# Patient Record
Sex: MALE | URBAN - METROPOLITAN AREA
[De-identification: names, ages, dates, MRNs, and addresses within clinical notes are randomized per-mention and may not be internally consistent; named-entity substitution may affect disease eponyms.]

---

## 2019-05-01 ENCOUNTER — APPOINTMENT (OUTPATIENT)
Age: 44
Setting detail: DERMATOLOGY
End: 2019-05-03

## 2019-05-01 VITALS — HEIGHT: 70 IN | WEIGHT: 210 LBS | RESPIRATION RATE: 16 BRPM

## 2019-05-01 DIAGNOSIS — L74.51 PRIMARY FOCAL HYPERHIDROSIS: ICD-10-CM

## 2019-05-01 DIAGNOSIS — L21.8 OTHER SEBORRHEIC DERMATITIS: ICD-10-CM

## 2019-05-01 PROBLEM — L74.510 PRIMARY FOCAL HYPERHIDROSIS, AXILLA: Status: ACTIVE | Noted: 2019-05-01

## 2019-05-01 PROCEDURE — OTHER PRESCRIPTION: OTHER

## 2019-05-01 PROCEDURE — 64650 CHEMODENERV ECCRINE GLANDS: CPT

## 2019-05-01 PROCEDURE — 99213 OFFICE O/P EST LOW 20 MIN: CPT | Mod: 25

## 2019-05-01 PROCEDURE — OTHER COUNSELING: OTHER

## 2019-05-01 PROCEDURE — OTHER BOTOX HYPERHIDROSIS THERAPY: OTHER

## 2019-05-01 RX ORDER — KETOCONAZOLE 20.5 MG/ML
2% SHAMPOO, SUSPENSION TOPICAL BIW
Qty: 1 | Refills: 6 | Status: ERX | COMMUNITY
Start: 2019-05-01

## 2019-05-01 ASSESSMENT — LOCATION DETAILED DESCRIPTION DERM
LOCATION DETAILED: LEFT AXILLARY VAULT
LOCATION DETAILED: RIGHT AXILLARY VAULT
LOCATION DETAILED: POSTERIOR MID-PARIETAL SCALP

## 2019-05-01 ASSESSMENT — LOCATION SIMPLE DESCRIPTION DERM
LOCATION SIMPLE: POSTERIOR SCALP
LOCATION SIMPLE: LEFT AXILLARY VAULT
LOCATION SIMPLE: RIGHT AXILLARY VAULT

## 2019-05-01 ASSESSMENT — LOCATION ZONE DERM
LOCATION ZONE: SCALP
LOCATION ZONE: AXILLAE

## 2019-05-01 NOTE — HPI: SWEATING (HYPERHIDROSIS)
How Severe Is It?: severe
Is This A New Presentation, Or A Follow-Up?: Follow Up Hyperhidrosis
Sweating Severity Scale: 3- The sweating is barely tolerable and frequently interferes with daily activities

## 2019-05-01 NOTE — PROCEDURE: COUNSELING
Patient Specific Counseling (Will Not Stick From Patient To Patient): ***Ketoconazole shampoo 2% refilled today
Detail Level: Zone

## 2019-12-19 ENCOUNTER — APPOINTMENT (OUTPATIENT)
Age: 44
Setting detail: DERMATOLOGY
End: 2019-12-19

## 2019-12-19 VITALS — HEIGHT: 72 IN | WEIGHT: 200 LBS | RESPIRATION RATE: 16 BRPM

## 2019-12-19 DIAGNOSIS — L74.51 PRIMARY FOCAL HYPERHIDROSIS: ICD-10-CM

## 2019-12-19 PROBLEM — L74.510 PRIMARY FOCAL HYPERHIDROSIS, AXILLA: Status: ACTIVE | Noted: 2019-12-19

## 2019-12-19 PROCEDURE — OTHER COUNSELING: OTHER

## 2019-12-19 PROCEDURE — 64650 CHEMODENERV ECCRINE GLANDS: CPT

## 2019-12-19 PROCEDURE — OTHER BOTOX HYPERHIDROSIS THERAPY: OTHER

## 2019-12-19 ASSESSMENT — LOCATION SIMPLE DESCRIPTION DERM
LOCATION SIMPLE: LEFT AXILLARY VAULT
LOCATION SIMPLE: RIGHT AXILLARY VAULT

## 2019-12-19 ASSESSMENT — LOCATION ZONE DERM: LOCATION ZONE: AXILLAE

## 2019-12-19 ASSESSMENT — LOCATION DETAILED DESCRIPTION DERM
LOCATION DETAILED: LEFT AXILLARY VAULT
LOCATION DETAILED: RIGHT AXILLARY VAULT

## 2019-12-19 NOTE — PROCEDURE: BOTOX HYPERHIDROSIS THERAPY
Procedure Details: - Patient presents today for hyperhidrosis botox therapeutic treatment.\\n- Pt was approved by Raquel for treatment today.\\[facility]: [id number] Procedure Details: - Patient presents today for hyperhidrosis botox therapeutic treatment.\\n- Pt was approved by Raquel for treatment today.\\[facility]: [phone]

## 2019-12-19 NOTE — HPI: SWEATING (HYPERHIDROSIS)
Is This A New Presentation, Or A Follow-Up?: Follow Up Hyperhidrosis
Additional History: Patient is here today for repeat hyperhidrosis Botox injections.

## 2019-12-19 NOTE — PROCEDURE: BOTOX HYPERHIDROSIS THERAPY
Consent: - Verbal and written consent obtained. \\n- Risks include but not limited to temporary bruising, swelling and tenderness at injection site, infection, muscle weakness, temporary effect, and incomplete chemical denervation of sweat glands.

## 2020-09-23 ENCOUNTER — APPOINTMENT (OUTPATIENT)
Dept: URBAN - METROPOLITAN AREA CLINIC 252 | Age: 45
Setting detail: DERMATOLOGY
End: 2020-09-27

## 2020-09-23 VITALS — RESPIRATION RATE: 16 BRPM | HEIGHT: 72 IN | WEIGHT: 195 LBS

## 2020-09-23 DIAGNOSIS — L74.51 PRIMARY FOCAL HYPERHIDROSIS: ICD-10-CM

## 2020-09-23 PROBLEM — L74.510 PRIMARY FOCAL HYPERHIDROSIS, AXILLA: Status: ACTIVE | Noted: 2020-09-23

## 2020-09-23 PROCEDURE — 64650 CHEMODENERV ECCRINE GLANDS: CPT

## 2020-09-23 PROCEDURE — OTHER COUNSELING: OTHER

## 2020-09-23 PROCEDURE — OTHER BOTOX HYPERHIDROSIS THERAPY: OTHER

## 2020-09-23 ASSESSMENT — LOCATION DETAILED DESCRIPTION DERM
LOCATION DETAILED: RIGHT AXILLARY VAULT
LOCATION DETAILED: LEFT AXILLARY VAULT

## 2020-09-23 ASSESSMENT — LOCATION SIMPLE DESCRIPTION DERM
LOCATION SIMPLE: RIGHT AXILLARY VAULT
LOCATION SIMPLE: LEFT AXILLARY VAULT

## 2020-09-23 ASSESSMENT — LOCATION ZONE DERM: LOCATION ZONE: AXILLAE

## 2020-09-23 NOTE — PROCEDURE: BOTOX HYPERHIDROSIS THERAPY
Procedure Details: - Patient presents today for hyperhidrosis botox therapeutic treatment.\\n- Pt was approved by Margarette KILLIAN for treatment today.\\[facility]: [phone] Procedure Details: - Patient presents today for hyperhidrosis botox therapeutic treatment.\\n- Pt was approved by Margarette KILLIAN for treatment today.\\[facility]: [phone]

## 2021-04-19 ENCOUNTER — RX ONLY (RX ONLY)
Age: 46
End: 2021-04-19

## 2021-04-19 RX ORDER — ONABOTULINUMTOXINA 100 [USP'U]/1
INJECTION, POWDER, LYOPHILIZED, FOR SOLUTION INTRADERMAL; INTRAMUSCULAR
Qty: 2 | Refills: 0 | Status: ERX | COMMUNITY
Start: 2021-04-19

## 2021-04-20 ENCOUNTER — APPOINTMENT (OUTPATIENT)
Dept: URBAN - METROPOLITAN AREA CLINIC 252 | Age: 46
Setting detail: DERMATOLOGY
End: 2021-04-21

## 2021-04-20 VITALS — WEIGHT: 200 LBS | HEIGHT: 72 IN | RESPIRATION RATE: 16 BRPM

## 2021-04-20 DIAGNOSIS — L21.8 OTHER SEBORRHEIC DERMATITIS: ICD-10-CM

## 2021-04-20 DIAGNOSIS — L90.5 SCAR CONDITIONS AND FIBROSIS OF SKIN: ICD-10-CM

## 2021-04-20 PROCEDURE — OTHER COUNSELING: OTHER

## 2021-04-20 PROCEDURE — 11900 INJECT SKIN LESIONS </W 7: CPT

## 2021-04-20 PROCEDURE — 99213 OFFICE O/P EST LOW 20 MIN: CPT | Mod: 25

## 2021-04-20 PROCEDURE — OTHER PRESCRIPTION: OTHER

## 2021-04-20 PROCEDURE — OTHER INTRALESIONAL KENALOG: OTHER

## 2021-04-20 PROCEDURE — OTHER MEDICATION COUNSELING: OTHER

## 2021-04-20 RX ORDER — FLUCONAZOLE 150 MG/1
150MG TABLET ORAL QD
Qty: 4 | Refills: 0 | Status: ERX | COMMUNITY
Start: 2021-04-20

## 2021-04-20 RX ORDER — BETAMETHASONE DIPROPIONATE 0.5 MG/ML
0.05% LOTION TOPICAL BID
Qty: 1 | Refills: 1 | Status: ERX | COMMUNITY
Start: 2021-04-20

## 2021-04-20 RX ORDER — KETOCONAZOLE 20 MG/ML
2% SHAMPOO, SUSPENSION TOPICAL BIW
Qty: 1 | Refills: 6 | Status: ERX | COMMUNITY
Start: 2021-04-20

## 2021-04-20 ASSESSMENT — LOCATION SIMPLE DESCRIPTION DERM
LOCATION SIMPLE: SCALP
LOCATION SIMPLE: RIGHT HAND

## 2021-04-20 ASSESSMENT — LOCATION ZONE DERM
LOCATION ZONE: HAND
LOCATION ZONE: SCALP

## 2021-04-20 ASSESSMENT — LOCATION DETAILED DESCRIPTION DERM
LOCATION DETAILED: RIGHT ULNAR DORSAL HAND
LOCATION DETAILED: LEFT SUPERIOR PARIETAL SCALP

## 2021-04-20 NOTE — HPI: RASH
What Type Of Note Output Would You Prefer (Optional)?: Bullet Format
How Severe Is Your Rash?: moderate
Is This A New Presentation, Or A Follow-Up?: Rash
Additional History: Burn hand

## 2021-05-12 ENCOUNTER — APPOINTMENT (OUTPATIENT)
Dept: URBAN - METROPOLITAN AREA CLINIC 252 | Age: 46
Setting detail: DERMATOLOGY
End: 2021-05-16

## 2021-05-12 VITALS — WEIGHT: 194 LBS | HEIGHT: 72 IN | RESPIRATION RATE: 16 BRPM

## 2021-05-12 DIAGNOSIS — L74.510 PRIMARY FOCAL HYPERHIDROSIS, AXILLA: ICD-10-CM

## 2021-05-12 PROCEDURE — 64650 CHEMODENERV ECCRINE GLANDS: CPT

## 2021-05-12 PROCEDURE — OTHER BOTOX HYPERHIDROSIS THERAPY: OTHER

## 2021-05-12 ASSESSMENT — LOCATION ZONE DERM: LOCATION ZONE: AXILLAE

## 2021-05-12 ASSESSMENT — LOCATION DETAILED DESCRIPTION DERM
LOCATION DETAILED: LEFT AXILLARY VAULT
LOCATION DETAILED: RIGHT AXILLARY VAULT

## 2021-05-12 ASSESSMENT — LOCATION SIMPLE DESCRIPTION DERM
LOCATION SIMPLE: RIGHT AXILLARY VAULT
LOCATION SIMPLE: LEFT AXILLARY VAULT

## 2021-05-12 NOTE — PROCEDURE: BOTOX HYPERHIDROSIS THERAPY
Procedure Details: - Patient presents today for Botox therapeutic treatment for hyperhidrosis.\\n- [facility]: [id number] Procedure Details: - Patient presents today for Botox therapeutic treatment for hyperhidrosis.\\n- [facility]: [id number]

## 2022-10-19 NOTE — PROCEDURE: MEDICATION COUNSELING
Ilumya Counseling: I discussed with the patient the risks of tildrakizumab including but not limited to immunosuppression, malignancy, posterior leukoencephalopathy syndrome, and serious infections.  The patient understands that monitoring is required including a PPD at baseline and must alert us or the primary physician if symptoms of infection or other concerning signs are noted. [FreeTextEntry1] : CXR PA and Lateral \par The costophrenic and cardiophrenic angles are sharp\par The wiliam parenchyma shows no infiltrates, consolidations, or nodules \par The Mediastinum is within normal limits\par No pleural effusions\par

## 2024-04-16 ENCOUNTER — LAB REQUISITION (OUTPATIENT)
Dept: LAB | Facility: HOSPITAL | Age: 49
End: 2024-04-16
Payer: COMMERCIAL

## 2024-04-16 DIAGNOSIS — M79.89 OTHER SPECIFIED SOFT TISSUE DISORDERS: ICD-10-CM

## 2024-04-16 DIAGNOSIS — M79.673 PAIN IN UNSPECIFIED FOOT: ICD-10-CM

## 2024-04-16 DIAGNOSIS — G89.18 OTHER ACUTE POSTPROCEDURAL PAIN: ICD-10-CM

## 2024-04-16 LAB
BASOPHILS # BLD AUTO: 0.1 10E3/UL (ref 0–0.2)
BASOPHILS NFR BLD AUTO: 1 %
CRP SERPL-MCNC: <3 MG/L
EOSINOPHIL # BLD AUTO: 0.2 10E3/UL (ref 0–0.7)
EOSINOPHIL NFR BLD AUTO: 3 %
ERYTHROCYTE [SEDIMENTATION RATE] IN BLOOD BY WESTERGREN METHOD: 9 MM/HR (ref 0–20)
IMM GRANULOCYTES # BLD: 0 10E3/UL
IMM GRANULOCYTES NFR BLD: 0 %
LYMPHOCYTES # BLD AUTO: 2.3 10E3/UL (ref 0.8–5.3)
LYMPHOCYTES NFR BLD AUTO: 36 %
MONOCYTES # BLD AUTO: 0.4 10E3/UL (ref 0–1.3)
MONOCYTES NFR BLD AUTO: 6 %
NEUTROPHILS # BLD AUTO: 3.4 10E3/UL (ref 1.6–8.3)
NEUTROPHILS NFR BLD AUTO: 53 %
NRBC # BLD AUTO: 0 10E3/UL
NRBC BLD AUTO-RTO: 0 /100
T4 FREE SERPL-MCNC: 1.26 NG/DL (ref 0.9–1.7)
TSH SERPL DL<=0.005 MIU/L-ACNC: 2.07 UIU/ML (ref 0.3–4.2)
URATE SERPL-MCNC: 4.2 MG/DL (ref 3.4–7)
WBC # BLD AUTO: 6.4 10E3/UL (ref 4–11)

## 2024-04-16 PROCEDURE — 84480 ASSAY TRIIODOTHYRONINE (T3): CPT | Mod: ORL | Performed by: ORTHOPAEDIC SURGERY

## 2024-04-16 PROCEDURE — 85048 AUTOMATED LEUKOCYTE COUNT: CPT | Mod: ORL | Performed by: ORTHOPAEDIC SURGERY

## 2024-04-16 PROCEDURE — 36415 COLL VENOUS BLD VENIPUNCTURE: CPT | Mod: ORL | Performed by: ORTHOPAEDIC SURGERY

## 2024-04-16 PROCEDURE — 86140 C-REACTIVE PROTEIN: CPT | Mod: ORL | Performed by: ORTHOPAEDIC SURGERY

## 2024-04-16 PROCEDURE — 84439 ASSAY OF FREE THYROXINE: CPT | Mod: ORL | Performed by: ORTHOPAEDIC SURGERY

## 2024-04-16 PROCEDURE — 84443 ASSAY THYROID STIM HORMONE: CPT | Mod: ORL | Performed by: ORTHOPAEDIC SURGERY

## 2024-04-16 PROCEDURE — 84436 ASSAY OF TOTAL THYROXINE: CPT | Mod: ORL | Performed by: ORTHOPAEDIC SURGERY

## 2024-04-16 PROCEDURE — 85652 RBC SED RATE AUTOMATED: CPT | Mod: ORL | Performed by: ORTHOPAEDIC SURGERY

## 2024-04-16 PROCEDURE — 84550 ASSAY OF BLOOD/URIC ACID: CPT | Mod: ORL | Performed by: ORTHOPAEDIC SURGERY

## 2024-04-16 PROCEDURE — 86038 ANTINUCLEAR ANTIBODIES: CPT | Mod: ORL | Performed by: ORTHOPAEDIC SURGERY

## 2024-04-16 PROCEDURE — 86618 LYME DISEASE ANTIBODY: CPT | Mod: ORL | Performed by: ORTHOPAEDIC SURGERY

## 2024-04-16 PROCEDURE — 86431 RHEUMATOID FACTOR QUANT: CPT | Mod: ORL | Performed by: ORTHOPAEDIC SURGERY

## 2024-04-17 LAB
ANA SER QL IF: NEGATIVE
B BURGDOR IGG+IGM SER QL: 0.04
RHEUMATOID FACT SERPL-ACNC: <10 IU/ML
T3 SERPL-MCNC: 86 NG/DL (ref 85–202)
T4 SERPL-MCNC: 7.3 UG/DL (ref 4.5–11.7)

## 2024-04-19 ENCOUNTER — LAB REQUISITION (OUTPATIENT)
Dept: LAB | Facility: CLINIC | Age: 49
End: 2024-04-19
Payer: COMMERCIAL

## 2024-04-19 DIAGNOSIS — L08.1 ERYTHRASMA: ICD-10-CM

## 2024-04-19 LAB
GRAM STAIN RESULT: NORMAL
GRAM STAIN RESULT: NORMAL

## 2024-04-19 PROCEDURE — 87075 CULTR BACTERIA EXCEPT BLOOD: CPT | Mod: ORL | Performed by: ORTHOPAEDIC SURGERY

## 2024-04-19 PROCEDURE — 87116 MYCOBACTERIA CULTURE: CPT | Mod: ORL | Performed by: ORTHOPAEDIC SURGERY

## 2024-04-19 PROCEDURE — 88305 TISSUE EXAM BY PATHOLOGIST: CPT | Mod: TC,ORL | Performed by: ORTHOPAEDIC SURGERY

## 2024-04-19 PROCEDURE — 87070 CULTURE OTHR SPECIMN AEROBIC: CPT | Mod: ORL | Performed by: ORTHOPAEDIC SURGERY

## 2024-04-19 PROCEDURE — 87186 SC STD MICRODIL/AGAR DIL: CPT | Mod: ORL | Performed by: ORTHOPAEDIC SURGERY

## 2024-04-19 PROCEDURE — 87102 FUNGUS ISOLATION CULTURE: CPT | Mod: ORL | Performed by: ORTHOPAEDIC SURGERY

## 2024-04-19 PROCEDURE — 87205 SMEAR GRAM STAIN: CPT | Mod: ORL | Performed by: ORTHOPAEDIC SURGERY

## 2024-04-19 PROCEDURE — 87206 SMEAR FLUORESCENT/ACID STAI: CPT | Mod: ORL | Performed by: ORTHOPAEDIC SURGERY

## 2024-04-23 LAB
PATH REPORT.COMMENTS IMP SPEC: NORMAL
PATH REPORT.COMMENTS IMP SPEC: NORMAL
PATH REPORT.FINAL DX SPEC: NORMAL
PATH REPORT.GROSS SPEC: NORMAL
PATH REPORT.MICROSCOPIC SPEC OTHER STN: NORMAL
PATH REPORT.RELEVANT HX SPEC: NORMAL
PHOTO IMAGE: NORMAL

## 2024-04-23 PROCEDURE — 88305 TISSUE EXAM BY PATHOLOGIST: CPT | Mod: 26 | Performed by: PATHOLOGY

## 2024-04-24 LAB — BACTERIA TISS BX CULT: NO GROWTH

## 2024-04-30 LAB
BACTERIA TISS BX CULT: ABNORMAL
BACTERIA TISS BX CULT: ABNORMAL

## 2024-05-17 LAB — BACTERIA BONE ANAEROBE+AEROBE CULT: NO GROWTH

## 2024-06-15 LAB
ACID FAST STAIN (ARUP): NORMAL

## 2024-07-31 ENCOUNTER — LAB REQUISITION (OUTPATIENT)
Dept: LAB | Facility: CLINIC | Age: 49
End: 2024-07-31
Payer: COMMERCIAL

## 2024-07-31 DIAGNOSIS — L08.1 ERYTHRASMA: ICD-10-CM

## 2024-07-31 LAB
BACTERIA SPEC CULT: NORMAL
BACTERIA SPEC CULT: NORMAL
GRAM STAIN RESULT: NORMAL

## 2024-07-31 PROCEDURE — 87102 FUNGUS ISOLATION CULTURE: CPT | Mod: ORL | Performed by: ORTHOPAEDIC SURGERY

## 2024-07-31 PROCEDURE — 87070 CULTURE OTHR SPECIMN AEROBIC: CPT | Mod: ORL | Performed by: ORTHOPAEDIC SURGERY

## 2024-07-31 PROCEDURE — 87206 SMEAR FLUORESCENT/ACID STAI: CPT | Mod: ORL | Performed by: ORTHOPAEDIC SURGERY

## 2024-07-31 PROCEDURE — 87205 SMEAR GRAM STAIN: CPT | Mod: ORL | Performed by: ORTHOPAEDIC SURGERY

## 2024-07-31 PROCEDURE — 88311 DECALCIFY TISSUE: CPT | Mod: TC,ORL | Performed by: ORTHOPAEDIC SURGERY

## 2024-07-31 PROCEDURE — 87075 CULTR BACTERIA EXCEPT BLOOD: CPT | Mod: ORL | Performed by: ORTHOPAEDIC SURGERY

## 2024-07-31 PROCEDURE — 87186 SC STD MICRODIL/AGAR DIL: CPT | Mod: ORL | Performed by: ORTHOPAEDIC SURGERY

## 2024-08-01 ENCOUNTER — LAB REQUISITION (OUTPATIENT)
Dept: LAB | Facility: CLINIC | Age: 49
End: 2024-08-01
Payer: COMMERCIAL

## 2024-08-01 DIAGNOSIS — L08.1 ERYTHRASMA: ICD-10-CM

## 2024-08-02 ENCOUNTER — HOSPITAL ENCOUNTER (INPATIENT)
Facility: HOSPITAL | Age: 49
LOS: 2 days | Discharge: HOME | DRG: 561 | End: 2024-08-04
Attending: INTERNAL MEDICINE | Admitting: INTERNAL MEDICINE
Payer: COMMERCIAL

## 2024-08-02 DIAGNOSIS — T14.8XXA WOUND INFECTION: Primary | ICD-10-CM

## 2024-08-02 DIAGNOSIS — L08.9 WOUND INFECTION: Primary | ICD-10-CM

## 2024-08-02 LAB
ANION GAP SERPL CALCULATED.3IONS-SCNC: 11 MMOL/L (ref 7–15)
BASOPHILS # BLD AUTO: 0.1 10E3/UL (ref 0–0.2)
BASOPHILS NFR BLD AUTO: 1 %
BUN SERPL-MCNC: 16.5 MG/DL (ref 6–20)
CALCIUM SERPL-MCNC: 8.8 MG/DL (ref 8.8–10.4)
CHLORIDE SERPL-SCNC: 100 MMOL/L (ref 98–107)
CREAT SERPL-MCNC: 1.09 MG/DL (ref 0.67–1.17)
CRP SERPL-MCNC: <3 MG/L
EGFRCR SERPLBLD CKD-EPI 2021: 83 ML/MIN/1.73M2
EOSINOPHIL # BLD AUTO: 0.2 10E3/UL (ref 0–0.7)
EOSINOPHIL NFR BLD AUTO: 3 %
ERYTHROCYTE [DISTWIDTH] IN BLOOD BY AUTOMATED COUNT: 14.5 % (ref 10–15)
GLUCOSE SERPL-MCNC: 107 MG/DL (ref 70–99)
HCO3 SERPL-SCNC: 27 MMOL/L (ref 22–29)
HCT VFR BLD AUTO: 39.6 % (ref 40–53)
HGB BLD-MCNC: 13.1 G/DL (ref 13.3–17.7)
IMM GRANULOCYTES # BLD: 0 10E3/UL
IMM GRANULOCYTES NFR BLD: 0 %
LYMPHOCYTES # BLD AUTO: 2.6 10E3/UL (ref 0.8–5.3)
LYMPHOCYTES NFR BLD AUTO: 33 %
MCH RBC QN AUTO: 29 PG (ref 26.5–33)
MCHC RBC AUTO-ENTMCNC: 33.1 G/DL (ref 31.5–36.5)
MCV RBC AUTO: 88 FL (ref 78–100)
MONOCYTES # BLD AUTO: 0.5 10E3/UL (ref 0–1.3)
MONOCYTES NFR BLD AUTO: 6 %
NEUTROPHILS # BLD AUTO: 4.6 10E3/UL (ref 1.6–8.3)
NEUTROPHILS NFR BLD AUTO: 57 %
NRBC # BLD AUTO: 0 10E3/UL
NRBC BLD AUTO-RTO: 0 /100
PLATELET # BLD AUTO: 221 10E3/UL (ref 150–450)
POTASSIUM SERPL-SCNC: 3.9 MMOL/L (ref 3.4–5.3)
RBC # BLD AUTO: 4.51 10E6/UL (ref 4.4–5.9)
SODIUM SERPL-SCNC: 138 MMOL/L (ref 135–145)
WBC # BLD AUTO: 8 10E3/UL (ref 4–11)

## 2024-08-02 PROCEDURE — 250N000011 HC RX IP 250 OP 636: Performed by: INTERNAL MEDICINE

## 2024-08-02 PROCEDURE — 87040 BLOOD CULTURE FOR BACTERIA: CPT | Performed by: INTERNAL MEDICINE

## 2024-08-02 PROCEDURE — 36415 COLL VENOUS BLD VENIPUNCTURE: CPT | Performed by: INTERNAL MEDICINE

## 2024-08-02 PROCEDURE — 120N000001 HC R&B MED SURG/OB

## 2024-08-02 PROCEDURE — 99223 1ST HOSP IP/OBS HIGH 75: CPT | Performed by: INTERNAL MEDICINE

## 2024-08-02 PROCEDURE — 250N000013 HC RX MED GY IP 250 OP 250 PS 637: Performed by: INTERNAL MEDICINE

## 2024-08-02 PROCEDURE — 85004 AUTOMATED DIFF WBC COUNT: CPT | Performed by: INTERNAL MEDICINE

## 2024-08-02 PROCEDURE — 80048 BASIC METABOLIC PNL TOTAL CA: CPT | Performed by: INTERNAL MEDICINE

## 2024-08-02 PROCEDURE — 258N000003 HC RX IP 258 OP 636: Performed by: INTERNAL MEDICINE

## 2024-08-02 PROCEDURE — 86140 C-REACTIVE PROTEIN: CPT | Performed by: INTERNAL MEDICINE

## 2024-08-02 RX ORDER — FLUTICASONE FUROATE AND VILANTEROL 200; 25 UG/1; UG/1
1 POWDER RESPIRATORY (INHALATION) DAILY
COMMUNITY
Start: 2024-03-07

## 2024-08-02 RX ORDER — AMOXICILLIN 250 MG
1 CAPSULE ORAL 2 TIMES DAILY PRN
Status: DISCONTINUED | OUTPATIENT
Start: 2024-08-02 | End: 2024-08-04 | Stop reason: HOSPADM

## 2024-08-02 RX ORDER — NALOXONE HYDROCHLORIDE 0.4 MG/ML
0.4 INJECTION, SOLUTION INTRAMUSCULAR; INTRAVENOUS; SUBCUTANEOUS
Status: DISCONTINUED | OUTPATIENT
Start: 2024-08-02 | End: 2024-08-04 | Stop reason: HOSPADM

## 2024-08-02 RX ORDER — ROSUVASTATIN CALCIUM 10 MG/1
10 TABLET, COATED ORAL DAILY
COMMUNITY
Start: 2024-02-27

## 2024-08-02 RX ORDER — ONDANSETRON 4 MG/1
4 TABLET, ORALLY DISINTEGRATING ORAL EVERY 6 HOURS PRN
Status: DISCONTINUED | OUTPATIENT
Start: 2024-08-02 | End: 2024-08-04 | Stop reason: HOSPADM

## 2024-08-02 RX ORDER — VANCOMYCIN HYDROCHLORIDE 1 G/200ML
1000 INJECTION, SOLUTION INTRAVENOUS EVERY 12 HOURS
Status: DISCONTINUED | OUTPATIENT
Start: 2024-08-03 | End: 2024-08-04

## 2024-08-02 RX ORDER — ACETAMINOPHEN 650 MG/1
650 SUPPOSITORY RECTAL EVERY 4 HOURS PRN
Status: DISCONTINUED | OUTPATIENT
Start: 2024-08-02 | End: 2024-08-04 | Stop reason: HOSPADM

## 2024-08-02 RX ORDER — HYDROXYZINE HYDROCHLORIDE 25 MG/1
25 TABLET, FILM COATED ORAL EVERY 6 HOURS PRN
COMMUNITY

## 2024-08-02 RX ORDER — CALCIUM CARBONATE 500 MG/1
1000 TABLET, CHEWABLE ORAL 4 TIMES DAILY PRN
Status: DISCONTINUED | OUTPATIENT
Start: 2024-08-02 | End: 2024-08-02

## 2024-08-02 RX ORDER — AMOXICILLIN 250 MG
2 CAPSULE ORAL 2 TIMES DAILY PRN
Status: DISCONTINUED | OUTPATIENT
Start: 2024-08-02 | End: 2024-08-02

## 2024-08-02 RX ORDER — ACETAMINOPHEN 325 MG/1
650 TABLET ORAL EVERY 4 HOURS PRN
Status: DISCONTINUED | OUTPATIENT
Start: 2024-08-02 | End: 2024-08-02

## 2024-08-02 RX ORDER — CARBIDOPA AND LEVODOPA 25; 100 MG/1; MG/1
1 TABLET, EXTENDED RELEASE ORAL DAILY
COMMUNITY
Start: 2023-12-21 | End: 2024-09-10 | Stop reason: DRUGHIGH

## 2024-08-02 RX ORDER — LIDOCAINE 40 MG/G
CREAM TOPICAL
Status: DISCONTINUED | OUTPATIENT
Start: 2024-08-02 | End: 2024-08-04 | Stop reason: HOSPADM

## 2024-08-02 RX ORDER — MELOXICAM 7.5 MG/1
7.5 TABLET ORAL DAILY
Status: ON HOLD | COMMUNITY
Start: 2024-07-18 | End: 2024-08-02

## 2024-08-02 RX ORDER — HYDROCODONE BITARTRATE AND ACETAMINOPHEN 5; 325 MG/1; MG/1
1 TABLET ORAL EVERY 6 HOURS PRN
Status: ON HOLD | COMMUNITY
End: 2024-08-04

## 2024-08-02 RX ORDER — SERTRALINE HYDROCHLORIDE 100 MG/1
100 TABLET, FILM COATED ORAL DAILY
COMMUNITY
Start: 2024-04-17

## 2024-08-02 RX ORDER — NALOXONE HYDROCHLORIDE 0.4 MG/ML
0.2 INJECTION, SOLUTION INTRAMUSCULAR; INTRAVENOUS; SUBCUTANEOUS
Status: DISCONTINUED | OUTPATIENT
Start: 2024-08-02 | End: 2024-08-04 | Stop reason: HOSPADM

## 2024-08-02 RX ORDER — PIPERACILLIN SODIUM, TAZOBACTAM SODIUM 3; .375 G/15ML; G/15ML
3.38 INJECTION, POWDER, LYOPHILIZED, FOR SOLUTION INTRAVENOUS ONCE
Status: COMPLETED | OUTPATIENT
Start: 2024-08-02 | End: 2024-08-02

## 2024-08-02 RX ORDER — AMOXICILLIN 250 MG
2 CAPSULE ORAL 2 TIMES DAILY PRN
Status: DISCONTINUED | OUTPATIENT
Start: 2024-08-02 | End: 2024-08-04 | Stop reason: HOSPADM

## 2024-08-02 RX ORDER — CLONAZEPAM 0.5 MG/1
1 TABLET ORAL
COMMUNITY
End: 2024-09-10 | Stop reason: DRUGHIGH

## 2024-08-02 RX ORDER — CALCIUM CARBONATE 500 MG/1
1000 TABLET, CHEWABLE ORAL 4 TIMES DAILY PRN
Status: DISCONTINUED | OUTPATIENT
Start: 2024-08-02 | End: 2024-08-04 | Stop reason: HOSPADM

## 2024-08-02 RX ORDER — PIPERACILLIN SODIUM, TAZOBACTAM SODIUM 3; .375 G/15ML; G/15ML
3.38 INJECTION, POWDER, LYOPHILIZED, FOR SOLUTION INTRAVENOUS EVERY 8 HOURS
Status: DISCONTINUED | OUTPATIENT
Start: 2024-08-02 | End: 2024-08-02

## 2024-08-02 RX ORDER — LANSOPRAZOLE 30 MG/1
30 CAPSULE, DELAYED RELEASE ORAL
COMMUNITY
Start: 2024-02-27

## 2024-08-02 RX ORDER — ALBUTEROL SULFATE 90 UG/1
2 AEROSOL, METERED RESPIRATORY (INHALATION) EVERY 4 HOURS PRN
COMMUNITY
Start: 2024-05-23

## 2024-08-02 RX ORDER — KETOCONAZOLE 20 MG/ML
SHAMPOO TOPICAL DAILY PRN
COMMUNITY
Start: 2024-02-06

## 2024-08-02 RX ORDER — CEFAZOLIN SODIUM 1 G/50ML
2000 SOLUTION INTRAVENOUS ONCE
Status: COMPLETED | OUTPATIENT
Start: 2024-08-02 | End: 2024-08-03

## 2024-08-02 RX ORDER — OXYCODONE HYDROCHLORIDE 5 MG/1
5 TABLET ORAL EVERY 4 HOURS PRN
Status: DISCONTINUED | OUTPATIENT
Start: 2024-08-02 | End: 2024-08-04 | Stop reason: HOSPADM

## 2024-08-02 RX ORDER — PIPERACILLIN SODIUM, TAZOBACTAM SODIUM 3; .375 G/15ML; G/15ML
3.38 INJECTION, POWDER, LYOPHILIZED, FOR SOLUTION INTRAVENOUS EVERY 8 HOURS
Status: DISCONTINUED | OUTPATIENT
Start: 2024-08-03 | End: 2024-08-03

## 2024-08-02 RX ORDER — ACETAMINOPHEN 325 MG/1
650 TABLET ORAL EVERY 4 HOURS PRN
Status: DISCONTINUED | OUTPATIENT
Start: 2024-08-02 | End: 2024-08-04 | Stop reason: HOSPADM

## 2024-08-02 RX ORDER — HYDROMORPHONE HCL IN WATER/PF 6 MG/30 ML
0.2 PATIENT CONTROLLED ANALGESIA SYRINGE INTRAVENOUS
Status: DISCONTINUED | OUTPATIENT
Start: 2024-08-02 | End: 2024-08-02

## 2024-08-02 RX ORDER — ACETAMINOPHEN 650 MG/1
650 SUPPOSITORY RECTAL EVERY 4 HOURS PRN
Status: DISCONTINUED | OUTPATIENT
Start: 2024-08-02 | End: 2024-08-02

## 2024-08-02 RX ORDER — AMOXICILLIN 250 MG
1 CAPSULE ORAL 2 TIMES DAILY PRN
Status: DISCONTINUED | OUTPATIENT
Start: 2024-08-02 | End: 2024-08-02

## 2024-08-02 RX ORDER — CARBIDOPA AND LEVODOPA 25; 100 MG/1; MG/1
1 TABLET ORAL 3 TIMES DAILY
COMMUNITY
Start: 2023-06-28

## 2024-08-02 RX ORDER — ONDANSETRON 2 MG/ML
4 INJECTION INTRAMUSCULAR; INTRAVENOUS EVERY 6 HOURS PRN
Status: DISCONTINUED | OUTPATIENT
Start: 2024-08-02 | End: 2024-08-04 | Stop reason: HOSPADM

## 2024-08-02 RX ADMIN — OXYCODONE HYDROCHLORIDE 5 MG: 5 TABLET ORAL at 21:54

## 2024-08-02 RX ADMIN — SODIUM CHLORIDE 2000 MG: 9 INJECTION, SOLUTION INTRAVENOUS at 22:28

## 2024-08-02 RX ADMIN — HYDROMORPHONE HYDROCHLORIDE 0.2 MG: 0.2 INJECTION, SOLUTION INTRAMUSCULAR; INTRAVENOUS; SUBCUTANEOUS at 19:43

## 2024-08-02 RX ADMIN — PIPERACILLIN AND TAZOBACTAM 3.38 G: 3; .375 INJECTION, POWDER, FOR SOLUTION INTRAVENOUS at 21:54

## 2024-08-02 ASSESSMENT — ACTIVITIES OF DAILY LIVING (ADL)
ADLS_ACUITY_SCORE: 34
DIFFICULTY_COMMUNICATING: NO
CONCENTRATING,_REMEMBERING_OR_MAKING_DECISIONS_DIFFICULTY: NO
ADLS_ACUITY_SCORE: 23
HEARING_DIFFICULTY_OR_DEAF: NO
DOING_ERRANDS_INDEPENDENTLY_DIFFICULTY: NO
CHANGE_IN_FUNCTIONAL_STATUS_SINCE_ONSET_OF_CURRENT_ILLNESS/INJURY: NO
ADLS_ACUITY_SCORE: 23
WALKING_OR_CLIMBING_STAIRS_DIFFICULTY: NO
EQUIPMENT_CURRENTLY_USED_AT_HOME: OTHER (SEE COMMENTS)
ADLS_ACUITY_SCORE: 34
DRESSING/BATHING_DIFFICULTY: NO
WEAR_GLASSES_OR_BLIND: YES
ADLS_ACUITY_SCORE: 23
TOILETING_ISSUES: NO
VISION_MANAGEMENT: GLASSES
DIFFICULTY_EATING/SWALLOWING: NO
ADLS_ACUITY_SCORE: 23
FALL_HISTORY_WITHIN_LAST_SIX_MONTHS: NO

## 2024-08-02 ASSESSMENT — COLUMBIA-SUICIDE SEVERITY RATING SCALE - C-SSRS
6. HAVE YOU EVER DONE ANYTHING, STARTED TO DO ANYTHING, OR PREPARED TO DO ANYTHING TO END YOUR LIFE?: NO
1. IN THE PAST MONTH, HAVE YOU WISHED YOU WERE DEAD OR WISHED YOU COULD GO TO SLEEP AND NOT WAKE UP?: NO
2. HAVE YOU ACTUALLY HAD ANY THOUGHTS OF KILLING YOURSELF IN THE PAST MONTH?: NO

## 2024-08-02 NOTE — PROGRESS NOTES
Patient admitted to room 02 at approximately 1719 via ambulation from direct admit- [facility].  Reason for Admission:   Report received from:   Patient was accompanied by Spouse.  Discharge transportation provided by:  Patient ambulated/transferred:  independently. self.  Patient is alert and orientated x 3.  Outpatient Observation education provided to: (patient, family, friend)  MDRO Education done if applicable (MRSA, VRE, etc)  Safety risks were identified during admission:  none.   Yellow risk/fall band applied:  No  Home meds sent home: No  Home meds sent to pharmacy:No IF YES add 1/2 sheet laminated page reminder to chart/clipboard   Detailed Belongings: Cell phone, laptop, earbuds, headphones, tshirt, shorts, underwear, sweatshirt, hat, knee scooter, chapstick, glasses

## 2024-08-02 NOTE — H&P
[facility]    History and Physical - Hospitalist Service       Date of Admission:  8/2/2024    Assessment & Plan        Isma Cook is a 48 year old male admitted on 8/2/2024.     He previously underwent right first MTP fusion on 3/6/2024.    He had persistent swelling postoperatively and underwent irrigation debridement on 4/19/2024 but cultures were negative other than 1 growth of Staphylococcus on the broth.    Admitted with post op wound infection        A/p :         Rt. 1st MTP fusion on 8/2/2024  Post op wound infection    He previously underwent right first MTP fusion on 3/6/2024.    He had persistent swelling postoperatively and underwent irrigation debridement on 4/19/2024 but cultures were negative other than 1 growth of Staphylococcus on the broth. This was felt to be likely contaminant.   He unfortunately has gone on to have persistent swelling despite normal inflammatory markers and as such he elected to proceed with revision on 7/31/2024.    At the time revision there was no obvious sign of infection but there was also no significant healing.    Ortho team revised his fixation and irrigate and debride and sent tissue for culture.    His cultures did come back with Staphylococcus Caprae.   He was transferred to the hospital for intravenous antibiotics and infectious disease consultation.     Blood cultures sent, follow wound cultures  Started on broad spectrum iv antibiotics - zosyn + vancomycin  Consult ortho + ID      Asthma  : on ellipta  Parkinson : on Sinemet   ER before 11 pm,  mg tid  HLD : on statin  Anxiety/Depression : on zoloft 100 mg daily, klonopin 10 mg at bedtime                   Diet:  NPO  DVT Prophylaxis: Pneumatic Compression Devices  Lopez Catheter: Not present  Lines: None     Cardiac Monitoring: None  Code Status:  full    Clinically Significant Risk Factors Present on Admission                                     Disposition Plan      Medically Ready for Discharge: Anticipated in 2-4 Days           Jacob Aranda MD  Hospitalist Service  [facility]  Securely message with [employer] (more info)  Text page via [employer] Paging/Directory     ______________________________________________________________________    Chief Complaint   Right MTP infection    History is obtained from the patient    History of Present Illness       He previously underwent right first MTP fusion on 3/6/2024.    He had persistent swelling postoperatively and underwent irrigation debridement on 4/19/2024 but cultures were negative other than 1 growth of Staphylococcus on the broth.    This was felt to be likely contaminant. He unfortunately has gone on to have persistent swelling despite normal inflammatory markers and as such he elected to proceed with revision on 7/31/2024.    At the time revision there was no obvious sign of infection but there was also no significant healing.  We did revise his fixation and irrigate and debride and sent tissue for culture.  His cultures did come back with Staphylococcus Caprae. He is now being transferred to the hospital for intravenous antibiotics and infectious disease consultation. He denies any fevers chills sweats feeling sick or unwell.  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    Past Surgical History   No past surgical history on file.    Prior to Admission Medications   None        Review of Systems          No fever or chills  No cp, sob, cough or phlegm  No abdominal symptoms  No urinary symptoms  No neuro symptoms           Physical Exam   Vital Signs: Temp: 98.1  F (36.7  C) Temp src: Oral BP: 112/73 Pulse: 79   Resp: 18 SpO2: 96 % O2 Device: None (Room air)    Weight: 0  lbs 0 oz       GENERAL: The patient is not in any acute distressed. Awake and alert.  HEENT: Nonicteric sclerae, PERRLA, EOMI. Oropharynx clear. Moist mucous membranes. Conjunctivae appear well perfused.  HEART: Regular rate and rhythm without murmurs.  LUNGS: Clear to auscultation bilaterally. No wheezing or crackles.  ABDOMEN: Soft, positive bowel sounds, nontender.  SKIN: No rash, no excessive bruising, petechiae, or purpura.  EXTREMITIES : no rashes, no swelling in legs, Rt. first MTP in dressing  NEUROLOGIC: conscious and oriented, follows commands, no obvious focal deficits.  ROS: All other systems negative       Medical Decision Making       76 MINUTES SPENT BY ME on the date of service doing chart review, history, exam, documentation & further activities per the note.  MANAGEMENT DISCUSSED with the following over the past 24 hours: rn, patient       Data     I have personally reviewed the following data over the past 24 hrs:    8.0  \   13.1 (L)   / 221     138 100 16.5 /  107 (H)   3.9 27 1.09 \     Procal: N/A CRP: <3.00 Lactic Acid: N/A

## 2024-08-03 LAB
ANION GAP SERPL CALCULATED.3IONS-SCNC: 6 MMOL/L (ref 7–15)
ANION GAP SERPL CALCULATED.3IONS-SCNC: 6 MMOL/L (ref 7–15)
BASOPHILS # BLD AUTO: 0 10E3/UL (ref 0–0.2)
BASOPHILS NFR BLD AUTO: 1 %
BUN SERPL-MCNC: 14.2 MG/DL (ref 6–20)
CALCIUM SERPL-MCNC: 8.8 MG/DL (ref 8.8–10.4)
CHLORIDE SERPL-SCNC: 103 MMOL/L (ref 98–107)
CHLORIDE SERPL-SCNC: 103 MMOL/L (ref 98–107)
CREAT SERPL-MCNC: 1.11 MG/DL (ref 0.67–1.17)
EGFRCR SERPLBLD CKD-EPI 2021: 81 ML/MIN/1.73M2
EOSINOPHIL # BLD AUTO: 0.2 10E3/UL (ref 0–0.7)
EOSINOPHIL NFR BLD AUTO: 3 %
ERYTHROCYTE [DISTWIDTH] IN BLOOD BY AUTOMATED COUNT: 14.3 % (ref 10–15)
GLUCOSE SERPL-MCNC: 91 MG/DL (ref 70–99)
HCO3 SERPL-SCNC: 29 MMOL/L (ref 22–29)
HCO3 SERPL-SCNC: 29 MMOL/L (ref 22–29)
HCT VFR BLD AUTO: 35.8 % (ref 40–53)
HGB BLD-MCNC: 12.1 G/DL (ref 13.3–17.7)
IMM GRANULOCYTES # BLD: 0 10E3/UL
IMM GRANULOCYTES NFR BLD: 0 %
LYMPHOCYTES # BLD AUTO: 2.9 10E3/UL (ref 0.8–5.3)
LYMPHOCYTES NFR BLD AUTO: 39 %
MCH RBC QN AUTO: 29.5 PG (ref 26.5–33)
MCHC RBC AUTO-ENTMCNC: 33.8 G/DL (ref 31.5–36.5)
MCV RBC AUTO: 87 FL (ref 78–100)
MONOCYTES # BLD AUTO: 0.6 10E3/UL (ref 0–1.3)
MONOCYTES NFR BLD AUTO: 8 %
NEUTROPHILS # BLD AUTO: 3.6 10E3/UL (ref 1.6–8.3)
NEUTROPHILS NFR BLD AUTO: 49 %
NRBC # BLD AUTO: 0 10E3/UL
NRBC BLD AUTO-RTO: 0 /100
PLATELET # BLD AUTO: 195 10E3/UL (ref 150–450)
POTASSIUM SERPL-SCNC: 4.1 MMOL/L (ref 3.4–5.3)
POTASSIUM SERPL-SCNC: 4.1 MMOL/L (ref 3.4–5.3)
RBC # BLD AUTO: 4.1 10E6/UL (ref 4.4–5.9)
SODIUM SERPL-SCNC: 138 MMOL/L (ref 135–145)
SODIUM SERPL-SCNC: 138 MMOL/L (ref 135–145)
WBC # BLD AUTO: 7.3 10E3/UL (ref 4–11)

## 2024-08-03 PROCEDURE — 250N000011 HC RX IP 250 OP 636

## 2024-08-03 PROCEDURE — 84295 ASSAY OF SERUM SODIUM: CPT | Performed by: INTERNAL MEDICINE

## 2024-08-03 PROCEDURE — 36415 COLL VENOUS BLD VENIPUNCTURE: CPT | Performed by: INTERNAL MEDICINE

## 2024-08-03 PROCEDURE — 120N000001 HC R&B MED SURG/OB

## 2024-08-03 PROCEDURE — 99207 PR APP CREDIT; MD BILLING SHARED VISIT: CPT | Mod: FS | Performed by: HOSPITALIST

## 2024-08-03 PROCEDURE — 99222 1ST HOSP IP/OBS MODERATE 55: CPT | Performed by: INTERNAL MEDICINE

## 2024-08-03 PROCEDURE — 99233 SBSQ HOSP IP/OBS HIGH 50: CPT | Mod: FS

## 2024-08-03 PROCEDURE — 85025 COMPLETE CBC W/AUTO DIFF WBC: CPT | Performed by: INTERNAL MEDICINE

## 2024-08-03 PROCEDURE — 250N000013 HC RX MED GY IP 250 OP 250 PS 637: Performed by: INTERNAL MEDICINE

## 2024-08-03 PROCEDURE — 99418 PROLNG IP/OBS E/M EA 15 MIN: CPT | Mod: FS

## 2024-08-03 PROCEDURE — 80048 BASIC METABOLIC PNL TOTAL CA: CPT | Performed by: INTERNAL MEDICINE

## 2024-08-03 PROCEDURE — 250N000011 HC RX IP 250 OP 636: Performed by: INTERNAL MEDICINE

## 2024-08-03 PROCEDURE — 250N000013 HC RX MED GY IP 250 OP 250 PS 637

## 2024-08-03 RX ORDER — SERTRALINE HYDROCHLORIDE 100 MG/1
100 TABLET, FILM COATED ORAL DAILY
Status: DISCONTINUED | OUTPATIENT
Start: 2024-08-03 | End: 2024-08-04 | Stop reason: HOSPADM

## 2024-08-03 RX ORDER — CARBIDOPA AND LEVODOPA 25; 100 MG/1; MG/1
1 TABLET, EXTENDED RELEASE ORAL DAILY
Status: DISCONTINUED | OUTPATIENT
Start: 2024-08-03 | End: 2024-08-04 | Stop reason: HOSPADM

## 2024-08-03 RX ORDER — ALBUTEROL SULFATE 90 UG/1
2 AEROSOL, METERED RESPIRATORY (INHALATION) EVERY 4 HOURS PRN
Status: DISCONTINUED | OUTPATIENT
Start: 2024-08-03 | End: 2024-08-04 | Stop reason: HOSPADM

## 2024-08-03 RX ORDER — CARBIDOPA AND LEVODOPA 25; 100 MG/1; MG/1
1 TABLET ORAL 3 TIMES DAILY
Status: DISCONTINUED | OUTPATIENT
Start: 2024-08-03 | End: 2024-08-04 | Stop reason: HOSPADM

## 2024-08-03 RX ORDER — HYDROXYZINE HYDROCHLORIDE 25 MG/1
25 TABLET, FILM COATED ORAL EVERY 6 HOURS PRN
Status: DISCONTINUED | OUTPATIENT
Start: 2024-08-03 | End: 2024-08-04 | Stop reason: HOSPADM

## 2024-08-03 RX ORDER — PANTOPRAZOLE SODIUM 40 MG/1
40 TABLET, DELAYED RELEASE ORAL
Status: DISCONTINUED | OUTPATIENT
Start: 2024-08-03 | End: 2024-08-04 | Stop reason: HOSPADM

## 2024-08-03 RX ORDER — CLONAZEPAM 0.5 MG/1
1 TABLET ORAL
Status: DISCONTINUED | OUTPATIENT
Start: 2024-08-03 | End: 2024-08-04 | Stop reason: HOSPADM

## 2024-08-03 RX ORDER — FLUTICASONE FUROATE AND VILANTEROL 200; 25 UG/1; UG/1
1 POWDER RESPIRATORY (INHALATION) DAILY
Status: DISCONTINUED | OUTPATIENT
Start: 2024-08-03 | End: 2024-08-04 | Stop reason: HOSPADM

## 2024-08-03 RX ORDER — ROSUVASTATIN CALCIUM 10 MG/1
10 TABLET, COATED ORAL DAILY
Status: DISCONTINUED | OUTPATIENT
Start: 2024-08-03 | End: 2024-08-04 | Stop reason: HOSPADM

## 2024-08-03 RX ORDER — HYDROMORPHONE HYDROCHLORIDE 1 MG/ML
0.5 INJECTION, SOLUTION INTRAMUSCULAR; INTRAVENOUS; SUBCUTANEOUS
Status: DISCONTINUED | OUTPATIENT
Start: 2024-08-03 | End: 2024-08-04 | Stop reason: HOSPADM

## 2024-08-03 RX ADMIN — SENNOSIDES AND DOCUSATE SODIUM 2 TABLET: 8.6; 5 TABLET ORAL at 16:25

## 2024-08-03 RX ADMIN — FLUTICASONE FUROATE AND VILANTEROL TRIFENATATE 1 PUFF: 200; 25 POWDER RESPIRATORY (INHALATION) at 10:19

## 2024-08-03 RX ADMIN — HYDROMORPHONE HYDROCHLORIDE 0.5 MG: 1 INJECTION, SOLUTION INTRAMUSCULAR; INTRAVENOUS; SUBCUTANEOUS at 22:28

## 2024-08-03 RX ADMIN — HYDROMORPHONE HYDROCHLORIDE 0.5 MG: 1 INJECTION, SOLUTION INTRAMUSCULAR; INTRAVENOUS; SUBCUTANEOUS at 10:20

## 2024-08-03 RX ADMIN — HYDROXYZINE HYDROCHLORIDE 25 MG: 25 TABLET ORAL at 09:36

## 2024-08-03 RX ADMIN — OXYCODONE HYDROCHLORIDE 5 MG: 5 TABLET ORAL at 08:25

## 2024-08-03 RX ADMIN — CARBIDOPA AND LEVODOPA 1 TABLET: 25; 100 TABLET, EXTENDED RELEASE ORAL at 10:18

## 2024-08-03 RX ADMIN — CARBIDOPA AND LEVODOPA 1 TABLET: 25; 100 TABLET ORAL at 14:18

## 2024-08-03 RX ADMIN — VANCOMYCIN HYDROCHLORIDE 1000 MG: 1 INJECTION, SOLUTION INTRAVENOUS at 10:30

## 2024-08-03 RX ADMIN — CARBIDOPA AND LEVODOPA 1 TABLET: 25; 100 TABLET ORAL at 22:27

## 2024-08-03 RX ADMIN — OXYCODONE HYDROCHLORIDE 5 MG: 5 TABLET ORAL at 16:25

## 2024-08-03 RX ADMIN — OXYCODONE HYDROCHLORIDE 5 MG: 5 TABLET ORAL at 02:13

## 2024-08-03 RX ADMIN — PIPERACILLIN AND TAZOBACTAM 3.38 G: 3; .375 INJECTION, POWDER, FOR SOLUTION INTRAVENOUS at 04:49

## 2024-08-03 RX ADMIN — VANCOMYCIN HYDROCHLORIDE 1000 MG: 1 INJECTION, SOLUTION INTRAVENOUS at 22:28

## 2024-08-03 RX ADMIN — SENNOSIDES AND DOCUSATE SODIUM 2 TABLET: 8.6; 5 TABLET ORAL at 02:13

## 2024-08-03 RX ADMIN — SERTRALINE HYDROCHLORIDE 100 MG: 100 TABLET ORAL at 10:18

## 2024-08-03 RX ADMIN — ROSUVASTATIN CALCIUM 10 MG: 10 TABLET, FILM COATED ORAL at 10:18

## 2024-08-03 ASSESSMENT — ACTIVITIES OF DAILY LIVING (ADL)
ADLS_ACUITY_SCORE: 24
ADLS_ACUITY_SCORE: 22
ADLS_ACUITY_SCORE: 22
ADLS_ACUITY_SCORE: 24
ADLS_ACUITY_SCORE: 23
ADLS_ACUITY_SCORE: 24
ADLS_ACUITY_SCORE: 23
ADLS_ACUITY_SCORE: 23
ADLS_ACUITY_SCORE: 22
ADLS_ACUITY_SCORE: 23
ADLS_ACUITY_SCORE: 23
ADLS_ACUITY_SCORE: 22
ADLS_ACUITY_SCORE: 25
ADLS_ACUITY_SCORE: 23
ADLS_ACUITY_SCORE: 24
ADLS_ACUITY_SCORE: 23
ADLS_ACUITY_SCORE: 22
ADLS_ACUITY_SCORE: 25
ADLS_ACUITY_SCORE: 25
ADLS_ACUITY_SCORE: 24
ADLS_ACUITY_SCORE: 25
ADLS_ACUITY_SCORE: 22
ADLS_ACUITY_SCORE: 22

## 2024-08-03 NOTE — PROGRESS NOTES
"Pt stating that he is here for IV abx- however none ordered. I have paged [facility] for several pts with no response- Pt and family upset and stating will leave. CAn you review and help please. Thanks    Maycol text paged at 2100.    Maycol stated that he would be reaching out to [facility] to discuss this pt.     Pt wife came out to the desk and stated that they will \"be leaving in 15 minutes\" if nothing gets addressed.     Writer got a hold of provider and orders were placed at 2140  "

## 2024-08-03 NOTE — CONSULTS
"Consultation - INFECTIOUS DISEASE CONSULTATION  Isma Cook ,  1975, MRN [id number]      infected first MTP joint, need I/V antibiotics  Wound infection    PCP: No primary care provider on file., None   Code status:  Full Code               Assessment:  DORA rae hardware infection, right great toe: present in 2024 and 2024 cultures. Hardware in place-likely cause of nonhealing. No systemic signs infection.  Parkinsons    Active Problems:    Wound infection      Recommendations:   - vanc IV  - need to add on susceptibilities to  cultures to determine final abx plan. Discussed with micro lab and they will add on  - further recommendations to follow clinical course  - ID will follow, thanks    Valencia Haskins MD  [facility]  Office Telephone [phone].  Fax [phone]  [facility] paging      HPI:    Isma Cook is a 48 year old male. History is provided by patient, chart.  Toe surgery in March. Incision healed over but \"didn't feel right\" with swelling, discomfort. Surgery again end April, CoNS in broth only thought to be contaminant. Again swelling, discomfort so went for hardware revision , cultures with dora rae again so sent in for abx plan. Today, he feels ok. Swelling in foot better. Tolerating antibiotics. Unclear what additional surgical plan is.      Chief complaint: Active Problems:    Wound infection      Medical History  Active Ambulatory Problems     Diagnosis Date Noted    No Active Ambulatory Problems     Resolved Ambulatory Problems     Diagnosis Date Noted    No Resolved Ambulatory Problems     No Additional Past Medical History         Surgical History  He  has no past surgical history on file.       Social History  Reviewed, and he          Family History  Reviewed and noncontributory to present problem, and family history is not on file.     Psychosocial Needs  Social History     Social History Narrative    Not on file     Additional " psychosocial needs reviewed per nursing assessment.       Allergies   Allergen Reactions    Ciprofloxacin      Skin reactions    Levaquin [Levofloxacin]      Skin reactions    Sulfa Antibiotics      Skin reactions      Medications Prior to Admission   Medication Sig Dispense Refill Last Dose    albuterol (PROAIR HFA/PROVENTIL HFA/VENTOLIN HFA) 108 (90 Base) MCG/ACT inhaler Inhale 2 puffs into the lungs every 4 hours as needed   More than a month at prn    carbidopa-levodopa (SINEMET CR)  MG CR tablet Take 1 tablet by mouth daily   8/1/2024 at pm    carbidopa-levodopa (SINEMET)  MG tablet Take 1 tablet by mouth 3 times daily   8/2/2024 at x3    clonazePAM (KLONOPIN) 0.5 MG tablet Take 1 mg by mouth nightly as needed for anxiety   8/1/2024 at \[facility]\""    fluticasone-vilanterol (BREO ELLIPTA) 200-25 MCG/ACT inhaler Inhale 1 puff into the lungs daily   8/2/2024 at am    HYDROcodone-acetaminophen (NORCO) 5-325 MG tablet Take 1 tablet by mouth every 6 hours as needed for severe pain   Past Week at prn    hydrOXYzine HCl (ATARAX) 25 MG tablet Take 25 mg by mouth every 6 hours as needed for other (pain)   Past Week at prn    ketoconazole (NIZORAL) 2 % external shampoo Apply topically daily as needed   Unknown at prn    LANsoprazole (PREVACID) 30 MG DR capsule Take 30 mg by mouth every morning (before breakfast)   8/2/2024 at am    rosuvastatin (CRESTOR) 10 MG tablet Take 10 mg by mouth daily   8/2/2024 at am    sertraline (ZOLOFT) 100 MG tablet Take 100 mg by mouth daily   8/2/2024 at am    Suvorexant (BELSOMRA) 10 MG tablet Take 10 mg by mouth at bedtime   8/1/2024 at \[facility]\""        Review of Systems:  A 12 point comprehensive review of systems was negative except as noted. Physical Exam:  Temp:  [97.8  F (36.6  C)-98.1  F (36.7  C)] 97.8  F (36.6  C)  Pulse:  [65-79] 65  Resp:  [16-18] 16  BP: (112-123)/(73-85) 123/82  SpO2:  [95 %-96 %] 95 %    GEN: alert and oriented x3, NAD  HEAD: atraumatic  ENT: moist membranes,  no thrush, anicteric sclera   NECK: supple, no nuchal rigidity  CARDIOVASCULAR: regular rate and rhythm, no murmurs, rubs, or gallops  PULMONARY: lungs clear to ausculation bilaterally  ABDOMEN: soft, nontender, nondistended. Normal bowel sounds  SKIN: no rashes or lesions. No stigma of endocarditis. Right toe incision ok, less swelling no erythema  PSYCH: grossly intact  MUSCULOSKELETAL: no synovitis               Pertinent Labs  personally reviewed.   CBC RESULTS:   Recent Labs   Lab Test 08/03/24  0540   WBC 7.3   RBC 4.10*   HGB 12.1*   HCT 35.8*   MCV 87   MCH 29.5   MCHC 33.8   RDW 14.3           Last Comprehensive Metabolic Panel:  Sodium   Date Value Ref Range Status   08/03/2024 138 135 - 145 mmol/L Final   08/03/2024 138 135 - 145 mmol/L Final     Potassium   Date Value Ref Range Status   08/03/2024 4.1 3.4 - 5.3 mmol/L Final   08/03/2024 4.1 3.4 - 5.3 mmol/L Final     Chloride   Date Value Ref Range Status   08/03/2024 103 98 - 107 mmol/L Final   08/03/2024 103 98 - 107 mmol/L Final     Carbon Dioxide (CO2)   Date Value Ref Range Status   08/03/2024 29 22 - 29 mmol/L Final   08/03/2024 29 22 - 29 mmol/L Final     Anion Gap   Date Value Ref Range Status   08/03/2024 6 (L) 7 - 15 mmol/L Final   08/03/2024 6 (L) 7 - 15 mmol/L Final     Glucose   Date Value Ref Range Status   08/03/2024 91 70 - 99 mg/dL Final     Urea Nitrogen   Date Value Ref Range Status   08/03/2024 14.2 6.0 - 20.0 mg/dL Final     Creatinine   Date Value Ref Range Status   08/03/2024 1.11 0.67 - 1.17 mg/dL Final     GFR Estimate   Date Value Ref Range Status   08/03/2024 81 >60 mL/min/1.73m2 Final     Comment:     eGFR calculated using 2021 CKD-EPI equation.     Calcium   Date Value Ref Range Status   08/03/2024 8.8 8.8 - 10.4 mg/dL Final     Comment:     Reference intervals for this test were updated on 7/16/2024 to reflect our healthy population more accurately. There may be differences in the flagging of prior results with  "similar values performed with this method. Those prior results can be interpreted in the context of the updated reference intervals.       No results found for: \"CRP\"     The following microbiology studies were personally reviewed:  No results found for: \"CULT\"    Urine Studies  No lab results found.    Vancomycin Levels  No lab results found.    Invalid input(s): \"VANCO\"    MICROBIOLOGY DATA:    All cultures:  7-Day Micro Results       Collected Updated Procedure Result Status      08/02/2024 1845 08/03/2024 0702 Blood Culture Peripheral Blood [[id number]]    Peripheral Blood    Preliminary result Component Value   Culture No growth after 12 hours  [P]                07/31/2024 0900 08/03/2024 0723 Tissue Aerobic Bacterial Culture Routine [[id number]]   Tissue from Toe, Right    Preliminary result Component Value   Culture No growth after 2 days  [P]                07/31/2024 0900 08/02/2024 1846 Anaerobic Bacterial Culture Routine [[id number]]    Tissue from Toe, Right    Preliminary result Component Value   Culture No anaerobic organisms isolated after 2 days  [P]                07/31/2024 0900 08/02/2024 1846 Fungal or Yeast Culture Routine [[id number]]   Tissue from Toe, Right    Preliminary result Component Value   Culture No growth after 2 days  [P]                07/31/2024 0900 07/31/2024 1152 Acid-Fast Bacilli Culture and Stain [[id number]]    Tissue from Toe, Right    In process Component Value   No component results            07/31/2024 0900 08/02/2024 1639 Acid-Fast Bacilli Culture and Stain [[id number]]    Tissue from Toe, Right    Preliminary result Component Value   Acid Fast Stain No acid fast bacilli seen  [P]    Performed By: [facility] [place], [place] [address]Gsncsernel Director: Mendez Mccarthy MD, PhDCLIA Number: [id number]   Acid Fast Stain No acid fast bacilli seen  [P]    Performed By: [facility] [place] [id number] " Director: Mendez Mccarthy MD, PhDCLIA Number: [id number]  This is an appended report. These results have been appended to a previously preliminary verified report.            07/31/2024 0900 08/02/2024 1916 Anaerobic Bacterial Culture Routine [[id number]]   Tissue from Toe, Right    Preliminary result Component Value   Culture No anaerobic organisms isolated after 2 days  [P]                07/31/2024 0900 08/03/2024 0723 Tissue Aerobic Bacterial Culture Routine [[id number]]    (Abnormal)   Tissue from Toe, Right    Preliminary result Component Value   Culture Culture in progress  [P]     1+ Staphylococcus caprae  [P]     Identification obtained by MALDI-TOF mass spectrometry research use only database. Test characteristics determined and verified by the [facility].  Susceptibilities not routinely done, refer to antibiogram to view typical susceptibility profiles               07/31/2024 0900 07/31/2024 1909 Gram Stain [[id number]]   Tissue from Toe, Right    Final result Component Value   GS Culture See corresponding culture for results   Gram Stain Result No organisms seen   Gram Stain Result No white blood cells seen            07/31/2024 0900 08/02/2024 1831 Anaerobic Bacterial Culture Routine [[id number]]    Tissue from Toe, Right    Preliminary result Component Value   Culture No anaerobic organisms isolated after 2 days  [P]                07/31/2024 0900 08/02/2024 1831 Fungal or Yeast Culture Routine [[id number]]   Tissue from Toe, Right    Preliminary result Component Value   Culture No growth after 2 days  [P]                07/31/2024 0900 07/31/2024 1158 Acid-Fast Bacilli Culture and Stain [[id number]]    Other from Toe, Right    In process Component Value   No component results            07/31/2024 0900 08/02/2024 1647 Acid-Fast Bacilli Culture and Stain [[id number]]    Other from Toe, Right    Preliminary result Component Value   Acid Fast Stain No acid  fast bacilli seen  [P]    Performed By: [facility] [place], [place] [address]Raterqedxg Director: Mendez Mccarthy MD, PhDCLIA Number: [id number]   Acid Fast Stain No acid fast bacilli seen  [P]    Performed By: [facility] [place], [place] [address]Aagxhhldjy Director: Mendez Mccarthy MD, PhDCLIA Number: [id number]  This is an appended report. These results have been appended to a previously preliminary verified report.            07/31/2024 0900 08/03/2024 0723 Tissue Aerobic Bacterial Culture Routine [[id number]]   (Abnormal)   Tissue from Toe, Right    Preliminary result Component Value   Culture Culture in progress  [P]     1+ Staphylococcus caprae  [P]     Identification obtained by MALDI-TOF mass spectrometry research use only database. Test characteristics determined and verified by the [facility].  Susceptibilities not routinely done, refer to antibiogram to view typical susceptibility profiles               07/31/2024 0900 08/02/2024 1846 Anaerobic Bacterial Culture Routine [[id number]]   Tissue from Toe, Right    Preliminary result Component Value   Culture No anaerobic organisms isolated after 2 days  [P]                07/31/2024 0900 08/02/2024 1846 Fungal or Yeast Culture Routine [[id number]]   Tissue from Toe, Right    Preliminary result Component Value   Culture No growth after 2 days  [P]                07/31/2024 0900 07/31/2024 1932 Gram Stain [[id number]]   Tissue from Toe, Right    Final result Component Value   GS Culture See corresponding culture for results   Gram Stain Result No organisms seen   Gram Stain Result 1+ WBC seen            07/31/2024 0900 07/31/2024 1200 Acid-Fast Bacilli Culture and Stain [[id number]]    Tissue from Toe, Right    In process Component Value   No component results            07/31/2024 0900 08/02/2024 1644 Acid-Fast Bacilli Culture and Stain [[id number]]    Tissue from Toe,  Right    Preliminary result Component Value   Acid Fast Stain No acid fast bacilli seen  [P]    Performed By: [facility] [place], [place] [address]Bqxyllcayc Director: Mendez Mccarthy MD, PhDCLIA Number: [id number]   Acid Fast Stain No acid fast bacilli seen  [P]    Performed By: [facility] [address] [place], [place] [address]Ehclvexxpp Director: Mendez Mccarthy MD, PhDCLIA Number: [id number]  This is an appended report. These results have been appended to a previously preliminary verified report.                     Pertinent Radiology  personally reviewed.     No results found.

## 2024-08-03 NOTE — PROGRESS NOTES
"Care Management Initial Consult    General Information  Assessment completed with: patient ,         Primary Care Provider verified and updated as needed:  yes   Readmission within the last 30 days:  no         Advance Care Planning:    no documents        Communication Assessment  Patient's communication style: spoken language (English or Bilingual)    Hearing Difficulty or Deaf: no   Wear Glasses or Blind: yes (Cheaters)    Cognitive  Cognitive/Neuro/Behavioral: WDL                      Living Environment:   People in home:     wife and college aged son  Current living Arrangements:        Able to return to prior arrangements: yes        Family/Social Support:  Care provided by: self, wife assists as needed   Provides care for:                  Description of Support System:  supportive         Current Resources:   Patient receiving home care services:  no     Community Resources:    Equipment currently used at home: other (see comments) (Scooter) and boot, non weight bearing  Supplies currently used at home:      Employment/Financial:  Employment Status: , has not worked for a year . Not a       Financial Concerns:   \"figuring it out\"          Does the patient's insurance plan have a 3 day qualifying hospital stay waiver?  No    Lifestyle & Psychosocial Needs:  Social Determinants of Health     Food Insecurity: Not on file   Depression: Not at risk (7/29/2024)    Received from [facility]     PHQ-2     PHQ2 Total: 0   Housing Stability: Not on file   Tobacco Use: Low Risk  (7/29/2024)    Received from [facility]     Patient History     Smoking Tobacco Use: Never     Smokeless Tobacco Use: Never     Passive Exposure: Not on file   Financial Resource Strain: Not on file   Alcohol Use: Not on file   Transportation Needs: Not on file   Physical Activity: Not on file   Interpersonal Safety: Not on file   Stress: Not on file   Social Connections: Not on file   Health " Literacy: Not on file       Functional Status:  Prior to admission patient needed assistance: transportation             Mental Health Status: no concerns          Chemical Dependency Status: no concerns                Values/Beliefs:  Spiritual, Cultural Beliefs, Jehovah's witness Practices, Values that affect care:    Judaism             Additional Information:  Patient is alert and oriented . He lives with wife Sanam. They have 4 adult children. He expects to discharge home with family transport. CM to follow. He may need home IVABs.    PAT Khan

## 2024-08-03 NOTE — PHARMACY-VANCOMYCIN DOSING SERVICE
"Pharmacy Vancomycin Initial Note  Date of Service 2024  Patient's  1975  48 year old, male    Indication: Skin and Soft Tissue Infection    Current estimated CrCl = Estimated Creatinine Clearance: 101.5 mL/min (based on SCr of 1.09 mg/dL).    Creatinine for last 3 days  2024:  6:45 PM Creatinine 1.09 mg/dL    Recent Vancomycin Level(s) for last 3 days  No results found for requested labs within last 3 days.      Vancomycin IV Administrations (past 72 hours)        No vancomycin orders with administrations in past 72 hours.                    Nephrotoxins and other renal medications (From now, onward)      Start     Dose/Rate Route Frequency Ordered Stop    24 1030  vancomycin (VANCOCIN) 1,000 mg in 200 mL dextrose intermittent infusion        Placed in \"Followed by\" Linked Group    1,000 mg  200 mL/hr over 1 Hours Intravenous EVERY 12 HOURS 24 0400  piperacillin-tazobactam (ZOSYN) 3.375 g vial to attach to  mL bag        Note to Pharmacy: For [facility], [facility] and [facility]: For Zosyn-naive patients, use the \"Zosyn initial dose + extended infusion\" order panel.    3.375 g  over 240 Minutes Intravenous EVERY 8 HOURS 24 2230  vancomycin (VANCOCIN) 2,000 mg in sodium chloride 0.9 % 500 mL intermittent infusion        Placed in \"Followed by\" Linked Group    2,000 mg  over 2 Hours Intravenous ONCE 24 2200  piperacillin-tazobactam (ZOSYN) 3.375 g vial to attach to  mL bag         3.375 g  over 30 Minutes Intravenous ONCE 24 2140              Contrast Orders - past 72 hours (72h ago, onward)      None            Insight[employer] Prediction of Planned Initial Vancomycin Regimen  Loading dose: 2000 mg at 22:30 2024.  Regimen: 1000 mg IV every 12 hours.  Start time: 10:30 on 2024  Exposure target: AUC24 (range)400-600 mg/L.hr   AUC24,ss: 504 mg/L.hr  Probability of AUC24 > 400: 74 %  Ctrough,ss: 16.2 " mg/L  Probability of Ctrough,ss > 20: 32 %  Probability of nephrotoxicity (Lodise LILI 2009): 12 %        Plan:  Start vancomycin 2000 mg IV Once, followed by 1000 mg IV  q12h.   Vancomycin monitoring method: AUC  Vancomycin therapeutic monitoring goal: 400-600 mg*h/L  Pharmacy will check vancomycin levels as appropriate in 1-3 Days.    Serum creatinine levels will be ordered daily for the first week of therapy and at least twice weekly for subsequent weeks.      Melinda Herrera, [facility]

## 2024-08-03 NOTE — PLAN OF CARE
Problem: Adult Inpatient Plan of Care  Goal: Optimal Comfort and Wellbeing  Intervention: Monitor Pain and Promote Comfort  Recent Flowsheet Documentation  Taken 8/3/2024 0258 by Liyah Noel RN  Pain Management Interventions:   pillow support provided   repositioned   rest  Taken 8/3/2024 0030 by Liyah Noel RN  Pain Management Interventions:   cold applied   pillow support provided   repositioned   rest     Problem: Pain Acute  Goal: Optimal Pain Control and Function  Intervention: Prevent or Manage Pain  Recent Flowsheet Documentation  Taken 8/3/2024 0030 by Liyah Noel RN  Bowel Elimination Promotion: (PRN Senna) other (see comments)  Medication Review/Management: medications reviewed   Goal Outcome Evaluation:      Plan of Care Reviewed With: patient    Overall Patient Progress: improvingOverall Patient Progress: improving    Pt A&Ox4. Vitals stable. Uses own scooter to ambulate to the bathroom. C/o pain at right toe gave PRN Oxy 5mg oral. Pain manageable to 3/10.  Last BM 7/29 gave PRN senna. NPO midnight for Ortho & ID consult today. IV abx Zosyn started at 0430.     DEONNA Arreola, RN

## 2024-08-03 NOTE — PLAN OF CARE
Goal Outcome Evaluation:      Problem: Adult Inpatient Plan of Care  Goal: Optimal Comfort and Wellbeing  Outcome: Progressing     Problem: Surgical Site Infection  Goal: Absence of Infection Signs and Symptoms  Outcome: Progressing       Patient arrived to the unit from the short stay unit.  Alert and oriented x 4.  NWB to RLE per patient report.  Wearing CAM boot and utilizes knee scooter to ambulate.  Independent with scooter.  Post-surgical shoe in place to LLE.  Patient stated provider saw wounds/incisions to bilateral feet and declined to have this writer remove dressings.  IV abx per orders.  Continue plan of care.

## 2024-08-03 NOTE — CONSULTS
ORTHOPEDIC CONSULTATION    Consultation  Isma Cook,  1975, MRN [id number]    infected first MTP joint, need I/V antibiotics  Wound infection    PCP: No primary care provider on file., None   Code status:  Full Code       Extended Emergency Contact Information  Primary Emergency Contact: Sanam Cook  Address: [address]           [place] [address]  Mobile Phone: [phone]  Relation: Spouse         IMPRESSION:  Isma Cook is a 48-year-old male who underwent right first MTP fusion on 3/6/2024 and subsequently underwent an I&D on 2024.  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    Admitted to the hospital yesterday for infectious disease consult, antibiotic plan, and coordination of care.    Appreciate multidisciplinary care team efforts with this patient.     PLAN:  - NWB RLE  - Keep dressings in place  - CAM boot when out of bed  - Antibiotics/dispo per ID/medicine -- appreciate assistance  - Will continue to follow    CHIEF COMPLAINT: Right toe infection    HISTORY OF PRESENT ILLNESS:  Isma Cook is a 48-year-old male who underwent a right first MTP fusion on 3/6/2024 and then subsequent underwent an I&D on 2024.  His postoperative course has been complicated by nonunion and he underwent revision fusion on 2024 with surgical cultures growing Staphylococcus caprae.    Presented to the emergency department yesterday for admission to the hospital for infectious disease consultation and coordination of care.    He denies fevers, chills, sweats.  Reports has been using his knee scooter since after surgery.  He is to remain nonweightbearing.  He is very diligent wearing the cam boot, but has it off during interview this morning.      ALLERGIES:   Review of patient's allergies indicates   Allergies   Allergen Reactions    Ciprofloxacin      Skin reactions    Levaquin [Levofloxacin]      Skin reactions     Sulfa Antibiotics      Skin reactions         MEDICATIONS UPON ADMISSION:  Medications were reviewed.  They include:   Medications Prior to Admission   Medication Sig Dispense Refill Last Dose    albuterol (PROAIR HFA/PROVENTIL HFA/VENTOLIN HFA) 108 (90 Base) MCG/ACT inhaler Inhale 2 puffs into the lungs every 4 hours as needed   More than a month at prn    carbidopa-levodopa (SINEMET CR)  MG CR tablet Take 1 tablet by mouth daily   8/1/2024 at pm    carbidopa-levodopa (SINEMET)  MG tablet Take 1 tablet by mouth 3 times daily   8/2/2024 at x3    clonazePAM (KLONOPIN) 0.5 MG tablet Take 1 mg by mouth nightly as needed for anxiety   8/1/2024 at [facility]    fluticasone-vilanterol (BREO ELLIPTA) 200-25 MCG/ACT inhaler Inhale 1 puff into the lungs daily   8/2/2024 at am    HYDROcodone-acetaminophen (NORCO) 5-325 MG tablet Take 1 tablet by mouth every 6 hours as needed for severe pain   Past Week at prn    hydrOXYzine HCl (ATARAX) 25 MG tablet Take 25 mg by mouth every 6 hours as needed for other (pain)   Past Week at prn    ketoconazole (NIZORAL) 2 % external shampoo Apply topically daily as needed   Unknown at prn    LANsoprazole (PREVACID) 30 MG DR capsule Take 30 mg by mouth every morning (before breakfast)   8/2/2024 at am    rosuvastatin (CRESTOR) 10 MG tablet Take 10 mg by mouth daily   8/2/2024 at am    sertraline (ZOLOFT) 100 MG tablet Take 100 mg by mouth daily   8/2/2024 at am    Suvorexant (BELSOMRA) 10 MG tablet Take 10 mg by mouth at bedtime   8/1/2024 at [facility]         SOCIAL HISTORY:   he        FAMILY HISTORY:  family history is not on file.      REVIEW OF SYSTEMS:   Reviewed with patient. See HPI, otherwise negative       PHYSICAL EXAMINATION:  Vitals: Temp:  [97.8  F (36.6  C)-98.1  F (36.7  C)] 97.8  F (36.6  C)  Pulse:  [65-79] 65  Resp:  [16-18] 16  BP: (112-123)/(73-85) 123/82  SpO2:  [95 %-96 %] 95 %  General: Resting comfortably in bed  Respiratory: Nonlabored breathing on room  air  Cardiovascular: Brisk cap refill in distal extremities  Right lower extremity:  -Resting clean dry and intact right foot  -Wiggles toes  -Sensation intact light touch in the dorsal and plantar toes  -Brisk capillary refill in the toes      RADIOGRAPHIC EVALUATION:  N/a    PERTINENT LABS:  Lab Results: personally reviewed.   not applicable  CRP <3  WBC 7.3  Hgb 12.1      Roberto Smith MD, MD  [facility]  Date: 8/3/2024  Time: 9:40 AM    CC1:   Prema Bansal MD    CC2:   No primary care provider on file.

## 2024-08-03 NOTE — PLAN OF CARE
Problem: Adult Inpatient Plan of Care  Goal: Absence of Hospital-Acquired Illness or Injury  Intervention: Identify and Manage Fall Risk  Recent Flowsheet Documentation  Taken 8/2/2024 1719 by Ralf Meeks, RN  Safety Promotion/Fall Prevention:   activity supervised   clutter free environment maintained   room organization consistent   safety round/check completed     Problem: Adult Inpatient Plan of Care  Goal: Absence of Hospital-Acquired Illness or Injury  Intervention: Prevent Infection  Recent Flowsheet Documentation  Taken 8/2/2024 1719 by Ralf Meeks, RN  Infection Prevention:   hand hygiene promoted   personal protective equipment utilized   single patient room provided     Problem: Adult Inpatient Plan of Care  Goal: Optimal Comfort and Wellbeing  Intervention: Monitor Pain and Promote Comfort  Recent Flowsheet Documentation  Taken 8/2/2024 1943 by Ralf Meeks, RN  Pain Management Interventions:   medication (see MAR)   emotional support   rest  Taken 8/2/2024 1718 by Ralf Meeks, RN  Pain Management Interventions: (No MD orders in yet)   emotional support   quiet environment facilitated   rest         Goal Outcome Evaluation:      Plan of Care Reviewed With: patient, spouse    Overall Patient Progress: improvingOverall Patient Progress: improving       A & O x4, pleasant. VSS on RA. Pain addressed with PRN medication. Vancomycin infusing via R PIV at end of shift. Regular diet, tolerating well. Independent with knee scooter. Family stopped by for support. Nursing continue to monitor.

## 2024-08-03 NOTE — PROGRESS NOTES
"[facility]    Medicine Progress Note - Hospitalist Service    Date of Admission:  8/2/2024    Assessment & Plan     Isma Cook is a 48-year-old male who underwent right first MTP fusion on 3/6/2024 and subsequently underwent an I&D on 4/19/2024.  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 Admitted to the hospital yesterday for infectious disease consult, antibiotic plan, and coordination of care.  Infectious disease and orthopedic consult recommendations IV antibiotics.  Patient likely to discharge in 2-3-day pending infectious disease and orthopedic consult final recommendations based on susceptibility culture.     # Right first MTP fusion 3/6/2024  # Postop wound infection  # Revision fusion on 7/31/2024  -Surgical cultures growing positive Staphylococcus Caprae  Orthopedic consult recommendation: nonweightbearing right lower extremity, cam boot when out of bed, antibiotics per infectious disease   Infectious disease consult recommendations: Vancomycin IV, add on susceptibilities to 7/31 cultures to determine final antibiotic plan  IV Dilaudid for pain  CRP normal  IV Vancomycin  IV Zosyn    # Anemia  Hemoglobin 13.1 yesterday trending down 12.1 today  Trend CBC in a.m.          Diet: Regular Diet Adult    DVT Prophylaxis: Pneumatic Compression Devices  Lopez Catheter: Not present  Lines: None     Cardiac Monitoring: None  Code Status: Full Code      Clinically Significant Risk Factors Present on Admission                          # Overweight: Estimated body mass index is 27.2 kg/m  as calculated from the following:    Height as of this encounter: 1.803 m (5' 11\").    Weight as of this encounter: 88.5 kg (195 lb).              Disposition Plan     Medically Ready for Discharge: Anticipated in 2-4 Days           The patient's care was discussed with the Attending Physician, Dr. Bansal .    Johnna Hammer, " NP  Hospitalist Service  [facility]  Securely message with App[employer] (more info)  Text page via Blinkit Paging/Directory   ______________________________________________________________________    Interval History   Orthopedic consult recommendation: nonweightbearing right lower extremity, cam boot when out of bed, antibiotics per infectious disease  Infectious disease consult recommendations: Vancomycin IV, add on susceptibilities to 731 cultures to determine final antibiotic plan  IV Dilaudid for pain    Physical Exam   Vital Signs: Temp: 97.8  F (36.6  C) Temp src: Oral BP: 123/82 Pulse: 65   Resp: 16 SpO2: 95 % O2 Device: None (Room air)    Weight: 195 lbs 0 oz    Constitutional: awake, alert, cooperative, no apparent distress, and appears stated age  Hematologic / Lymphatic: no cervical lymphadenopathy and no supraclavicular lymphadenopathy  Respiratory: No increased work of breathing, good air exchange, clear to auscultation bilaterally, no crackles or wheezing  Cardiovascular: Normal apical impulse, regular rate and rhythm, normal S1 and S2, no S3 or S4, and no murmur noted  GI: No scars, normal bowel sounds, soft, non-distended, non-tender, no masses palpated, no hepatosplenomegally  Skin: no bruising or bleeding, normal skin color, texture, turgor, no redness, warmth, or swelling, no rashes, and no lesions  Musculoskeletal: There is no redness, warmth, or swelling of the joints.  Full range of motion noted.  Motor strength is 5 out of 5 all extremities bilaterally.    Neurologic: Awake, alert, oriented to name, place and time.  Cranial nerves II-XII are grossly intact.  Motor is 5 out of 5 bilaterally.    Neuropsychiatric: General: normal, calm, and normal eye contact    Medical Decision Making       50 MINUTES SPENT BY ME on the date of service doing chart review, history, exam, documentation & further activities per the note.      Data     I have personally reviewed the following data  over the past 24 hrs:    7.3  \   12.1 (L)   / 195     138; 138 103; 103 14.2 /  91   4.1; 4.1 29; 29 1.11 \     Procal: N/A CRP: <3.00 Lactic Acid: N/A         Imaging results reviewed over the past 24 hrs:   No results found for this or any previous visit (from the past 24 hour(s)).

## 2024-08-03 NOTE — PLAN OF CARE
Problem: Adult Inpatient Plan of Care  Goal: Optimal Comfort and Wellbeing  Intervention: Monitor Pain and Promote Comfort  Recent Flowsheet Documentation  Taken 8/3/2024 1035 by Abigail Simon RN  Pain Management Interventions: emotional support  Taken 8/3/2024 1020 by Abigail Simon RN  Pain Management Interventions: medication (see MAR)  Taken 8/3/2024 0936 by Abigail Simon RN  Pain Management Interventions: medication (see MAR)  Taken 8/3/2024 0930 by Abigail Simon RN  Pain Management Interventions: emotional support  Taken 8/3/2024 0825 by Abigail Simon RN  Pain Management Interventions: medication (see MAR)     Problem: Surgical Site Infection  Goal: Absence of Infection Signs and Symptoms  Outcome: Progressing     Problem: Pain Acute  Goal: Optimal Pain Control and Function  Intervention: Prevent or Manage Pain  Recent Flowsheet Documentation  Taken 8/3/2024 0825 by Abigail Simon RN  Bowel Elimination Promotion: adequate fluid intake promoted   Goal Outcome Evaluation:       Pt AxO4, VSS, RA. Pt hasn't been out of bed for me but moves independently to bathroom with knee scooter and foot boot. Pt rating pain 7/10, given 5mg oxycodone and hydroxine without effect; provider notified and MAR updated, given 0.5 mg IV dilaudid with effect. IV Zosyn and Vancomycin completed.

## 2024-08-03 NOTE — PHARMACY-ADMISSION MEDICATION HISTORY
Pharmacist Admission Medication History    Admission medication history is complete. The information provided in this note is only as accurate as the sources available at the time of the update.    Information Source(s): Patient, Family member, and CareEverywhere/SureScripts via in-person    Pertinent Information: updated list based on [facility] list with [facility]    Changes made to PTA medication list:  Added: all  Deleted: None  Changed: None    Allergies reviewed with patient and updates made in EHR: yes    Medication History Completed By: GLENN DIAZ [facility] 8/2/2024 8:47 PM    PTA Med List   Medication Sig Last Dose    albuterol (PROAIR HFA/PROVENTIL HFA/VENTOLIN HFA) 108 (90 Base) MCG/ACT inhaler Inhale 2 puffs into the lungs every 4 hours as needed More than a month at prn    carbidopa-levodopa (SINEMET CR)  MG CR tablet Take 1 tablet by mouth daily 8/1/2024 at pm    carbidopa-levodopa (SINEMET)  MG tablet Take 1 tablet by mouth 3 times daily 8/2/2024 at x3    clonazePAM (KLONOPIN) 0.5 MG tablet Take 1 mg by mouth nightly as needed for anxiety 8/1/2024 at qhs    fluticasone-vilanterol (BREO ELLIPTA) 200-25 MCG/ACT inhaler Inhale 1 puff into the lungs daily 8/2/2024 at am    HYDROcodone-acetaminophen (NORCO) 5-325 MG tablet Take 1 tablet by mouth every 6 hours as needed for severe pain Past Week at prn    hydrOXYzine HCl (ATARAX) 25 MG tablet Take 25 mg by mouth every 6 hours as needed for other (pain) Past Week at prn    ketoconazole (NIZORAL) 2 % external shampoo Apply topically daily as needed Unknown at prn    LANsoprazole (PREVACID) 30 MG DR capsule Take 30 mg by mouth every morning (before breakfast) 8/2/2024 at am    rosuvastatin (CRESTOR) 10 MG tablet Take 10 mg by mouth daily 8/2/2024 at am    sertraline (ZOLOFT) 100 MG tablet Take 100 mg by mouth daily 8/2/2024 at am    Suvorexant (BELSOMRA) 10 MG tablet Take 10 mg by mouth at bedtime 8/1/2024 at qhs

## 2024-08-04 VITALS
SYSTOLIC BLOOD PRESSURE: 139 MMHG | HEART RATE: 83 BPM | HEIGHT: 71 IN | BODY MASS INDEX: 27.3 KG/M2 | DIASTOLIC BLOOD PRESSURE: 93 MMHG | OXYGEN SATURATION: 95 % | WEIGHT: 195 LBS | RESPIRATION RATE: 18 BRPM | TEMPERATURE: 97.5 F

## 2024-08-04 LAB
ANION GAP SERPL CALCULATED.3IONS-SCNC: 7 MMOL/L (ref 7–15)
BUN SERPL-MCNC: 15.5 MG/DL (ref 6–20)
CALCIUM SERPL-MCNC: 8.9 MG/DL (ref 8.8–10.4)
CHLORIDE SERPL-SCNC: 104 MMOL/L (ref 98–107)
CREAT SERPL-MCNC: 0.99 MG/DL (ref 0.67–1.17)
EGFRCR SERPLBLD CKD-EPI 2021: >90 ML/MIN/1.73M2
ERYTHROCYTE [DISTWIDTH] IN BLOOD BY AUTOMATED COUNT: 14.1 % (ref 10–15)
GLUCOSE SERPL-MCNC: 98 MG/DL (ref 70–99)
HCO3 SERPL-SCNC: 28 MMOL/L (ref 22–29)
HCT VFR BLD AUTO: 38 % (ref 40–53)
HGB BLD-MCNC: 12.6 G/DL (ref 13.3–17.7)
MCH RBC QN AUTO: 28.6 PG (ref 26.5–33)
MCHC RBC AUTO-ENTMCNC: 33.2 G/DL (ref 31.5–36.5)
MCV RBC AUTO: 86 FL (ref 78–100)
PLATELET # BLD AUTO: 209 10E3/UL (ref 150–450)
POTASSIUM SERPL-SCNC: 4 MMOL/L (ref 3.4–5.3)
RBC # BLD AUTO: 4.41 10E6/UL (ref 4.4–5.9)
SODIUM SERPL-SCNC: 139 MMOL/L (ref 135–145)
WBC # BLD AUTO: 6 10E3/UL (ref 4–11)

## 2024-08-04 PROCEDURE — 80048 BASIC METABOLIC PNL TOTAL CA: CPT

## 2024-08-04 PROCEDURE — 85027 COMPLETE CBC AUTOMATED: CPT

## 2024-08-04 PROCEDURE — 99239 HOSP IP/OBS DSCHRG MGMT >30: CPT | Performed by: INTERNAL MEDICINE

## 2024-08-04 PROCEDURE — 99232 SBSQ HOSP IP/OBS MODERATE 35: CPT | Performed by: INTERNAL MEDICINE

## 2024-08-04 PROCEDURE — 250N000011 HC RX IP 250 OP 636: Performed by: INTERNAL MEDICINE

## 2024-08-04 PROCEDURE — 250N000013 HC RX MED GY IP 250 OP 250 PS 637

## 2024-08-04 PROCEDURE — 36415 COLL VENOUS BLD VENIPUNCTURE: CPT

## 2024-08-04 RX ORDER — DOXYCYCLINE 100 MG/1
100 CAPSULE ORAL EVERY 12 HOURS SCHEDULED
Status: DISCONTINUED | OUTPATIENT
Start: 2024-08-04 | End: 2024-08-04 | Stop reason: HOSPADM

## 2024-08-04 RX ORDER — OXYCODONE HYDROCHLORIDE 5 MG/1
5 TABLET ORAL EVERY 4 HOURS PRN
Qty: 12 TABLET | Refills: 0 | Status: SHIPPED | OUTPATIENT
Start: 2024-08-04

## 2024-08-04 RX ORDER — ACETAMINOPHEN 325 MG/1
650 TABLET ORAL EVERY 4 HOURS PRN
COMMUNITY
Start: 2024-08-04

## 2024-08-04 RX ORDER — DOXYCYCLINE 100 MG/1
100 CAPSULE ORAL EVERY 12 HOURS
Qty: 90 CAPSULE | Refills: 0 | Status: SHIPPED | OUTPATIENT
Start: 2024-08-04 | End: 2024-09-10

## 2024-08-04 RX ORDER — AMOXICILLIN 250 MG
1 CAPSULE ORAL 2 TIMES DAILY PRN
Qty: 20 TABLET | Refills: 0 | Status: SHIPPED | OUTPATIENT
Start: 2024-08-04

## 2024-08-04 RX ADMIN — VANCOMYCIN HYDROCHLORIDE 1000 MG: 1 INJECTION, SOLUTION INTRAVENOUS at 10:41

## 2024-08-04 RX ADMIN — FLUTICASONE FUROATE AND VILANTEROL TRIFENATATE 1 PUFF: 200; 25 POWDER RESPIRATORY (INHALATION) at 08:48

## 2024-08-04 RX ADMIN — CARBIDOPA AND LEVODOPA 1 TABLET: 25; 100 TABLET ORAL at 08:48

## 2024-08-04 RX ADMIN — PANTOPRAZOLE SODIUM 40 MG: 40 TABLET, DELAYED RELEASE ORAL at 06:36

## 2024-08-04 RX ADMIN — SERTRALINE HYDROCHLORIDE 100 MG: 100 TABLET ORAL at 08:48

## 2024-08-04 RX ADMIN — ROSUVASTATIN CALCIUM 10 MG: 10 TABLET, FILM COATED ORAL at 08:48

## 2024-08-04 RX ADMIN — CARBIDOPA AND LEVODOPA 1 TABLET: 25; 100 TABLET, EXTENDED RELEASE ORAL at 10:40

## 2024-08-04 ASSESSMENT — ACTIVITIES OF DAILY LIVING (ADL)
ADLS_ACUITY_SCORE: 24
ADLS_ACUITY_SCORE: 25
ADLS_ACUITY_SCORE: 24
ADLS_ACUITY_SCORE: 25
ADLS_ACUITY_SCORE: 24
ADLS_ACUITY_SCORE: 25
ADLS_ACUITY_SCORE: 24
ADLS_ACUITY_SCORE: 25

## 2024-08-04 NOTE — PLAN OF CARE
Problem: Adult Inpatient Plan of Care  Goal: Plan of Care Review  Description: The Plan of Care Review/Shift note should be completed every shift.  The Outcome Evaluation is a brief statement about your assessment that the patient is improving, declining, or no change.  This information will be displayed automatically on your shift  note.  Outcome: Progressing     Problem: Surgical Site Infection  Goal: Absence of Infection Signs and Symptoms  Outcome: Progressing     Problem: Pain Acute  Goal: Optimal Pain Control and Function  Outcome: Progressing  Intervention: Prevent or Manage Pain  Recent Flowsheet Documentation  Taken 8/3/2024 1606 by Ailyn Galarza RN  Sensory Stimulation Regulation: care clustered  Medication Review/Management: medications reviewed  Intervention: Optimize Psychosocial Wellbeing  Recent Flowsheet Documentation  Taken 8/3/2024 1606 by Ailyn Galarza RN  Supportive Measures: active listening utilized     Problem: Gas Exchange Impaired  Goal: Optimal Gas Exchange  Outcome: Progressing     Problem: Adult Inpatient Plan of Care  Goal: Absence of Hospital-Acquired Illness or Injury  Intervention: Identify and Manage Fall Risk  Recent Flowsheet Documentation  Taken 8/3/2024 1606 by Ailyn Galarza RN  Safety Promotion/Fall Prevention: safety round/check completed  Intervention: Prevent Skin Injury  Recent Flowsheet Documentation  Taken 8/3/2024 1606 by Ailyn Galarza RN  Device Skin Pressure Protection: absorbent pad utilized/changed  Intervention: Prevent and Manage VTE (Venous Thromboembolism) Risk  Recent Flowsheet Documentation  Taken 8/3/2024 1606 by Ailyn Galarza RN  VTE Prevention/Management: patient refused intervention   Goal Outcome Evaluation:                    Pt. A&O. Complained of pain and oxy and iv dilaudid was given this shift. Belsomra given for sleep. Independent in  with scooter use. On RA. Abx given as per order. Continue with POC..

## 2024-08-04 NOTE — PLAN OF CARE
"  Problem: Adult Inpatient Plan of Care  Goal: Plan of Care Review  Description: The Plan of Care Review/Shift note should be completed every shift.  The Outcome Evaluation is a brief statement about your assessment that the patient is improving, declining, or no change.  This information will be displayed automatically on your shift  note.  8/4/2024 0412 by Ailyn Galarza RN  Outcome: Progressing  8/3/2024 2338 by Ailyn Galarza RN  Outcome: Progressing  Goal: Patient-Specific Goal (Individualized)  Description: You can add care plan individualizations to a care plan. Examples of Individualization might be:  \"Parent requests to be called daily at 9am for status\", \"I have a hard time hearing out of my right ear\", or \"Do not touch me to wake me up as it startles  me\".  Outcome: Progressing     Problem: Surgical Site Infection  Goal: Absence of Infection Signs and Symptoms  8/4/2024 0412 by Ailyn Galarza RN  Outcome: Progressing  8/3/2024 2338 by Ailyn Galarza RN  Outcome: Progressing     Problem: Pain Acute  Goal: Optimal Pain Control and Function  8/4/2024 0412 by Ailyn Galarza RN  Outcome: Progressing  8/3/2024 2338 by Ailyn Galarza RN  Outcome: Progressing  Intervention: Prevent or Manage Pain  Recent Flowsheet Documentation  Taken 8/4/2024 0200 by Ailyn Galarza RN  Sensory Stimulation Regulation: care clustered  Taken 8/3/2024 1606 by Ailyn Galarza RN  Sensory Stimulation Regulation: care clustered  Medication Review/Management: medications reviewed  Intervention: Optimize Psychosocial Wellbeing  Recent Flowsheet Documentation  Taken 8/4/2024 0200 by Ailyn Galarza, RN  Supportive Measures: active listening utilized  Taken 8/3/2024 1606 by Ailyn Galarza RN  Supportive Measures: active listening utilized     Problem: Gas Exchange Impaired  Goal: Optimal Gas Exchange  8/4/2024 0412 by Ailyn Galarza, RN  Outcome: Progressing  8/3/2024 2338 by Ailyn Gaalrza, RN  Outcome: Progressing   "   Problem: Adult Inpatient Plan of Care  Goal: Absence of Hospital-Acquired Illness or Injury  Intervention: Identify and Manage Fall Risk  Recent Flowsheet Documentation  Taken 8/3/2024 1606 by Ailyn Galarza RN  Safety Promotion/Fall Prevention: safety round/check completed  Intervention: Prevent Skin Injury  Recent Flowsheet Documentation  Taken 8/4/2024 0200 by Ailyn Galarza, RN  Device Skin Pressure Protection: absorbent pad utilized/changed  Taken 8/3/2024 1606 by Ailyn Galarza RN  Device Skin Pressure Protection: absorbent pad utilized/changed  Intervention: Prevent and Manage VTE (Venous Thromboembolism) Risk  Recent Flowsheet Documentation  Taken 8/4/2024 0200 by Ailyn Galarza, RN  VTE Prevention/Management: patient refused intervention  Taken 8/3/2024 1606 by Ailyn Galarza RN  VTE Prevention/Management: patient refused intervention   Goal Outcome Evaluation:                  Pt. A&O. Make needs known. Slept okay .RA. Continue with POC.

## 2024-08-04 NOTE — DISCHARGE SUMMARY
"[facility]  Hospitalist Discharge Summary      Date of Admission:  8/2/2024  Date of Discharge:  8/4/2024  Discharging Provider: German Michelle MD  Discharge Service: Hospitalist Service    Discharge Diagnoses   Right first MTP hardware infection  Parkinson  Asthma  GERD  Clinically Significant Risk Factors     # Overweight: Estimated body mass index is 27.2 kg/m  as calculated from the following:    Height as of this encounter: 1.803 m (5' 11\").    Weight as of this encounter: 88.5 kg (195 lb).       Follow-ups Needed After Discharge   Follow-up Appointments     Follow-up and recommended labs and tests       Follow-up with [facility] in 1 to 2 weeks.  Follow-up with   infectious disease in 4 weeks.            Unresulted Labs Ordered in the Past 30 Days of this Admission       Date and Time Order Name Status Description    8/2/2024  6:33 PM Blood Culture Peripheral Blood Preliminary     8/1/2024  5:25 PM SURGICAL PATHOLOGY EXAM In process     7/31/2024 12:00 PM AFB CULTURE AND STAIN NON BLOOD In process     7/31/2024 12:00 PM FUNGAL OR YEAST CULTURE ROUTINE Preliminary     7/31/2024 12:00 PM ANAEROBIC BACTERIAL CULTURE ROUTINE Preliminary     7/31/2024 12:00 PM AEROBIC BACTERIAL CULTURE ROUTINE Preliminary     7/31/2024 11:56 AM AFB CULTURE AND STAIN NON BLOOD In process     7/31/2024 11:56 AM FUNGAL OR YEAST CULTURE ROUTINE Preliminary     7/31/2024 11:56 AM ANAEROBIC BACTERIAL CULTURE ROUTINE Preliminary     7/31/2024 11:56 AM AEROBIC BACTERIAL CULTURE ROUTINE Preliminary     7/31/2024 11:46 AM ANAEROBIC BACTERIAL CULTURE ROUTINE Preliminary     7/31/2024 11:42 AM AFB CULTURE AND STAIN NON BLOOD In process     7/31/2024 11:42 AM FUNGAL OR YEAST CULTURE ROUTINE Preliminary     7/31/2024 11:42 AM ANAEROBIC BACTERIAL CULTURE ROUTINE Preliminary     7/31/2024 11:40 AM AEROBIC BACTERIAL CULTURE ROUTINE Preliminary         These results will be followed up by ID    Discharge Disposition "   Discharged to home  Condition at discharge: Stable    Hospital Course   48-year-old woman with Parkinson who underwent.  Right first MTP fusion on 3/6/2024 and I&D on 4/19/2024.  This was complicated by nonunion and underwent revision fusion on 7/31/2024.  Intraoperative cultures grew Staphylococcus caprae.  Patient was initially treated with IV vancomycin and ID recommended doxycycline p.o. twice daily at discharge for 6 weeks.  Follow-up with ID clinic in 1 month nonweightbearing on the right leg.    Consultations This Hospital Stay   INFECTIOUS DISEASES IP CONSULT  INFECTIOUS DISEASES IP CONSULT  ORTHOPEDIC SURGERY IP CONSULT  PHARMACY TO DOSE VANCO  WOUND OSTOMY CONTINENCE NURSE  IP CONSULT  PHARMACY TO DOSE VANCO    Code Status   Full Code    Time Spent on this Encounter   IGerman MD, personally saw the patient today and spent greater than 30 minutes discharging this patient.       German Michelle MD  [address] [address]  Phone: [phone]  Fax: [phone]  ______________________________________________________________________    Physical Exam   Vital Signs: Temp: 97.5  F (36.4  C) Temp src: Oral BP: (!) 139/93 Pulse: 83   Resp: 18 SpO2: 95 % O2 Device: None (Room air)    Weight: 195 lbs 0 oz  No apparent distress       Primary Care Physician   No primary care provider on file.    Discharge Orders      Reason for your hospital stay    Right first metatarsal infection     Follow-up and recommended labs and tests     Follow-up with [facility] in 1 to 2 weeks.  Follow-up with infectious disease in 4 weeks.     Activity    Your activity upon discharge: activity as tolerated.  Nonweightbearing on the right leg.     Diet    Follow this diet upon discharge: Orders Placed This Encounter      Regular Diet Adult       Significant Results and Procedures   No results found for this or any previous visit.    Discharge Medications   Current  Discharge Medication List        START taking these medications    Details   acetaminophen (TYLENOL) 325 MG tablet Take 2 tablets (650 mg) by mouth every 4 hours as needed for mild pain or other (and adjunct with moderate or severe pain or per patient request)    Associated Diagnoses: Wound infection      doxycycline monohydrate (MONODOX) 100 MG capsule Take 1 capsule (100 mg) by mouth every 12 hours for 45 days  Qty: 90 capsule, Refills: 0    Associated Diagnoses: Wound infection      oxyCODONE (ROXICODONE) 5 MG tablet Take 1 tablet (5 mg) by mouth every 4 hours as needed for severe pain or moderate pain  Qty: 12 tablet, Refills: 0    Associated Diagnoses: Wound infection      senna-docusate (SENOKOT-S/PERICOLACE) 8.6-50 MG tablet Take 1 tablet by mouth 2 times daily as needed for constipation  Qty: 20 tablet, Refills: 0    Associated Diagnoses: Wound infection           CONTINUE these medications which have NOT CHANGED    Details   albuterol (PROAIR HFA/PROVENTIL HFA/VENTOLIN HFA) 108 (90 Base) MCG/ACT inhaler Inhale 2 puffs into the lungs every 4 hours as needed      carbidopa-levodopa (SINEMET CR)  MG CR tablet Take 1 tablet by mouth daily      carbidopa-levodopa (SINEMET)  MG tablet Take 1 tablet by mouth 3 times daily      clonazePAM (KLONOPIN) 0.5 MG tablet Take 1 mg by mouth nightly as needed for anxiety      fluticasone-vilanterol (BREO ELLIPTA) 200-25 MCG/ACT inhaler Inhale 1 puff into the lungs daily      hydrOXYzine HCl (ATARAX) 25 MG tablet Take 25 mg by mouth every 6 hours as needed for other (pain)      ketoconazole (NIZORAL) 2 % external shampoo Apply topically daily as needed      LANsoprazole (PREVACID) 30 MG DR capsule Take 30 mg by mouth every morning (before breakfast)      rosuvastatin (CRESTOR) 10 MG tablet Take 10 mg by mouth daily      sertraline (ZOLOFT) 100 MG tablet Take 100 mg by mouth daily      Suvorexant (BELSOMRA) 10 MG tablet Take 10 mg by mouth at bedtime           STOP  taking these medications       HYDROcodone-acetaminophen (NORCO) 5-325 MG tablet Comments:   Reason for Stopping:             Allergies   Allergies   Allergen Reactions    Ciprofloxacin      Skin reactions    Levaquin [Levofloxacin]      Skin reactions    Sulfa Antibiotics      Skin reactions

## 2024-08-04 NOTE — PLAN OF CARE
Goal Outcome Evaluation:      Problem: Adult Inpatient Plan of Care  Goal: Optimal Comfort and Wellbeing  8/4/2024 1433 by Dora Leo, RN  Outcome: Adequate for Care Transition  8/4/2024 1128 by Dora Leo RN  Outcome: Progressing  Intervention: Monitor Pain and Promote Comfort  Recent Flowsheet Documentation  Taken 8/4/2024 0845 by Dora Leo RN  Pain Management Interventions:   distraction   diversional activity provided   emotional support   medication offered but refused   pillow support provided   repositioned   rest     Problem: Adult Inpatient Plan of Care  Goal: Readiness for Transition of Care  8/4/2024 1433 by Dora Leo, RN  Outcome: Adequate for Care Transition  8/4/2024 1128 by Dora Leo, RN  Outcome: Progressing   Patient discharged to home with wife.  Discharge instructions reviewed, home medications returned and all questions answered.

## 2024-08-04 NOTE — PLAN OF CARE
Goal Outcome Evaluation:      Problem: Adult Inpatient Plan of Care  Goal: Absence of Hospital-Acquired Illness or Injury  Intervention: Prevent Infection  Recent Flowsheet Documentation  Taken 8/4/2024 0845 by Dora Leo RN  Infection Prevention:   hand hygiene promoted   personal protective equipment utilized   rest/sleep promoted   single patient room provided     Problem: Adult Inpatient Plan of Care  Goal: Optimal Comfort and Wellbeing  Outcome: Progressing  Intervention: Monitor Pain and Promote Comfort  Recent Flowsheet Documentation  Taken 8/4/2024 0845 by Dora Leo RN  Pain Management Interventions:   distraction   diversional activity provided   emotional support   medication offered but refused   pillow support provided   repositioned   rest     Problem: Surgical Site Infection  Goal: Absence of Infection Signs and Symptoms  Outcome: Progressing     Problem: Pain Acute  Goal: Optimal Pain Control and Function  Intervention: Prevent or Manage Pain  Recent Flowsheet Documentation  Taken 8/4/2024 0845 by Dora Leo RN  Sensory Stimulation Regulation:   care clustered   quiet environment promoted  Bowel Elimination Promotion:   adequate fluid intake promoted   ambulation promoted  Medication Review/Management: medications reviewed       Patient is alert and oriented x 4.  Wearing CAM boot  to RLE and surgical shoe to left foot and utilizes knee scooter to ambulate. Independent with scooter. NWB to RLE. IV abx per orders. Continue plan of care.

## 2024-08-04 NOTE — PROGRESS NOTES
"INFECTIOUS DISEASE FOLLOW UP NOTE    Date: 2024   CHIEF COMPLAINT: No chief complaint on file.       ASSESSMENT:    S butch hardware infection, right great toe: present in 2024 and 2024 cultures. Hardware in place-likely cause of nonhealing. No systemic signs infection. Now s/p replacement of hardware .   Parkinsons    PLAN:  - vanc IV while in the hospital  - doxycyline 100mg PO BID at discharge, plan at least 6 weeks  - follow up ID clinic in a month to evaluate antibiotic plan. Message sent to schedulers  - ID will sign off. Please call with additional questions or change in clinical status.     Valencia Haskins MD  [facility]   Office Telephone [phone].  Fax [phone]  [employer] paging    ______________________________________________________________________    SUBJECTIVE / INTERVAL HISTORY:  Feet ok. Tolerating antibiotics. Wife at bedside. Discussed micro, options going forward.     ROS: All other systems negative except as listed above.    SH/FH/Habits/PMH reviewed and unchanged.    OBJECTIVE:  BP (!) 139/93 (BP Location: Left arm)   Pulse 83   Temp 97.5  F (36.4  C) (Oral)   Resp 18   Ht 1.803 m (5' 11\")   Wt 88.5 kg (195 lb)   SpO2 95%   BMI 27.20 kg/m       Resp: 18      Vital Signs  Temp: 97.5  F (36.4  C)  Temp src: Oral  Resp: 18  Pulse: 83  Pulse Rate Source: Monitor  BP: (!) 139/93  BP Location: Left arm    Temp (24hrs), Av.9  F (36.6  C), Min:97.5  F (36.4  C), Max:98.3  F (36.8  C)      GEN: No acute distress.    RESPIRATORY:  Normal breathing pattern.    EXTREMITIES: No edema.  SKIN/HAIR/NAILS:  No rashes. Feet wraps.   IV:  peripheral IV      Antibiotics:  Vanc IV    Pertinent labs:  No results found for: \"CRP\"   CBC RESULTS:   Recent Labs   Lab Test 24  0612   WBC 6.0   RBC 4.41   HGB 12.6*   HCT 38.0*   MCV 86   MCH 28.6   MCHC 33.2   RDW 14.1         Last Comprehensive Metabolic Panel:  Sodium   Date Value Ref Range Status "   08/04/2024 139 135 - 145 mmol/L Final     Potassium   Date Value Ref Range Status   08/04/2024 4.0 3.4 - 5.3 mmol/L Final     Chloride   Date Value Ref Range Status   08/04/2024 104 98 - 107 mmol/L Final     Carbon Dioxide (CO2)   Date Value Ref Range Status   08/04/2024 28 22 - 29 mmol/L Final     Anion Gap   Date Value Ref Range Status   08/04/2024 7 7 - 15 mmol/L Final     Glucose   Date Value Ref Range Status   08/04/2024 98 70 - 99 mg/dL Final     Urea Nitrogen   Date Value Ref Range Status   08/04/2024 15.5 6.0 - 20.0 mg/dL Final     Creatinine   Date Value Ref Range Status   08/04/2024 0.99 0.67 - 1.17 mg/dL Final     GFR Estimate   Date Value Ref Range Status   08/04/2024 >90 >60 mL/min/1.73m2 Final     Comment:     eGFR calculated using 2021 CKD-EPI equation.     Calcium   Date Value Ref Range Status   08/04/2024 8.9 8.8 - 10.4 mg/dL Final     Comment:     Reference intervals for this test were updated on 7/16/2024 to reflect our healthy population more accurately. There may be differences in the flagging of prior results with similar values performed with this method. Those prior results can be interpreted in the context of the updated reference intervals.        MICROBIOLOGY DATA:  Personally reviewed.  7-Day Micro Results       Collected Updated Procedure Result Status      08/02/2024 1845 08/03/2024 1901 Blood Culture Peripheral Blood [[id number]]    Peripheral Blood    Preliminary result Component Value   Culture No growth after 1 day  [P]                07/31/2024 0900 08/04/2024 0720 Tissue Aerobic Bacterial Culture Routine [[id number]]   Tissue from Toe, Right    Preliminary result Component Value   Culture No growth after 3 days  [P]                07/31/2024 0900 08/03/2024 1846 Anaerobic Bacterial Culture Routine [[id number]]    Tissue from Toe, Right    Preliminary result Component Value   Culture No anaerobic organisms isolated after 3 days  [P]                07/31/2024 0900 08/03/2024  1846 Fungal or Yeast Culture Routine [[id number]]   Tissue from Toe, Right    Preliminary result Component Value   Culture No growth after 3 days  [P]                07/31/2024 0900 07/31/2024 1152 Acid-Fast Bacilli Culture and Stain [[id number]]    Tissue from Toe, Right    In process Component Value   No component results            07/31/2024 0900 08/02/2024 1639 Acid-Fast Bacilli Culture and Stain [[id number]]    Tissue from Toe, Right    Preliminary result Component Value   Acid Fast Stain No acid fast bacilli seen  [P]    Performed By: [facility] [place] [id number] Director: Mendez Mccarthy MD, PhDCLIA Number: [id number]   Acid Fast Stain No acid fast bacilli seen  [P]    Performed By: [facility] [facility] [place] [id number] Director: Mendez Mccarthy MD, PhDCLIA Number: [id number]  This is an appended report. These results have been appended to a previously preliminary verified report.            07/31/2024 0900 08/03/2024 1412 Anaerobic Bacterial Culture Routine [[id number]]   (Abnormal)   Tissue from Toe, Right    Preliminary result Component Value   Culture No anaerobic organisms isolated after 2 days  [P]     Isolated in broth only Gram positive cocci  [P]     On day 3 of incubation  Possible aerobic organism, aerotolerance testing in progress. Await final report.               07/31/2024 0900 08/04/2024 0720 Tissue Aerobic Bacterial Culture Routine [[id number]]     (Abnormal)   Tissue from Toe, Right    Preliminary result Component Value   Culture Culture in progress  [P]     1+ Staphylococcus caprae  [P]     Identification obtained by MALDI-TOF mass spectrometry research use only database. Test characteristics determined and verified by the [facility] Diseases Diagnostic [facility].  Susceptibilities not routinely done, refer to antibiogram to view typical susceptibility profiles        Susceptibility        Staphylococcus  "caprae      DISK DIFFUSION SUSCEPTIBILITY CAROL      Cefoxitin Screen Negative  [*]         Ciprofloxacin  <=0.5 ug/mL Susceptible      Clindamycin  <=0.12 ug/mL Susceptible      Daptomycin  0.5 ug/mL Susceptible      Doxycycline  <=0.5 ug/mL Susceptible      Erythromycin  <=0.25 ug/mL Susceptible      Gentamicin  <=0.5 ug/mL Susceptible      Inducible macrolide resistance test  Negative ug/mL Negative  [*]       Levofloxacin  0.25 ug/mL Susceptible      Linezolid  2 ug/mL Susceptible  [*]       Moxifloxacin  <=0.25 ug/mL Susceptible  [*]       Nitrofurantoin  <=16 ug/mL Susceptible  [*]       Oxacillin  <=0.25 ug/mL Susceptible  [1]       Rifampin  <=0.5 ug/mL Susceptible  [*]       Tetracycline  <=1 ug/mL Susceptible      Tigecycline  <=0.12 ug/mL No interpretation available  [*]       Vancomycin  1 ug/mL Susceptible                     [*]  Suppressed Antibiotic     [1]  Oxacillin susceptible isolates are susceptible to cephalosporins (example: cefazolin and cephalexin) and beta lactam combination agents. Oxacillin resistant isolates are resistant to these agents.               Susceptibility Comments       Staphylococcus caprae    Antibiotics listed as \"No Interpretation\" have no regulatory guidelines for susceptibility/resistance available.               07/31/2024 0900 07/31/2024 1909 Gram Stain [[id number]]   Tissue from Toe, Right    Final result Component Value   GS Culture See corresponding culture for results   Gram Stain Result No organisms seen   Gram Stain Result No white blood cells seen            07/31/2024 0900 08/03/2024 1831 Anaerobic Bacterial Culture Routine [[id number]]    Tissue from Toe, Right    Preliminary result Component Value   Culture No anaerobic organisms isolated after 3 days  [P]                07/31/2024 0900 08/03/2024 1831 Fungal or Yeast Culture Routine [[id number]]   Tissue from Toe, Right    Preliminary result Component Value   Culture No growth after 3 days  [P]          "       07/31/2024 0900 07/31/2024 1158 Acid-Fast Bacilli Culture and Stain [[id number]]    Other from Toe, Right    In process Component Value   No component results            07/31/2024 0900 08/02/2024 1647 Acid-Fast Bacilli Culture and Stain [[id number]]    Other from Toe, Right    Preliminary result Component Value   Acid Fast Stain No acid fast bacilli seen  [P]    Performed By: [facility] [address] [place], [place] [id number] Director: Mendez Mccarthy MD, PhDCLIA Number: [id number]   Acid Fast Stain No acid fast bacilli seen  [P]    Performed By: [facility] [place], [place] [id number] Director: Mendez Mccarthy MD, PhDCLIA Number: [id number]  This is an appended report. These results have been appended to a previously preliminary verified report.            07/31/2024 0900 08/04/2024 0720 Tissue Aerobic Bacterial Culture Routine [[id number]]   (Abnormal)   Tissue from Toe, Right    Preliminary result Component Value   Culture Culture in progress  [P]     1+ Staphylococcus caprae  [P]     Identification obtained by MALDI-TOF mass spectrometry research use only database. Test characteristics determined and verified by the [facility] Diseases Diagnostic Laboratory.  Susceptibilities not routinely done, refer to antibiogram to view typical susceptibility profiles               07/31/2024 0900 08/03/2024 1846 Anaerobic Bacterial Culture Routine [[id number]]   Tissue from Toe, Right    Preliminary result Component Value   Culture No anaerobic organisms isolated after 3 days  [P]                07/31/2024 0900 08/03/2024 1846 Fungal or Yeast Culture Routine [[id number]]   Tissue from Toe, Right    Preliminary result Component Value   Culture No growth after 3 days  [P]                07/31/2024 0900 07/31/2024 1932 Gram Stain [[id number]]   Tissue from Toe, Right    Final result Component Value   GS Culture See corresponding culture for results   Gram  Stain Result No organisms seen   Gram Stain Result 1+ WBC seen            07/31/2024 0900 07/31/2024 1200 Acid-Fast Bacilli Culture and Stain [[id number]]    Tissue from Toe, Right    In process Component Value   No component results            07/31/2024 0900 08/02/2024 1644 Acid-Fast Bacilli Culture and Stain [[id number]]    Tissue from Toe, Right    Preliminary result Component Value   Acid Fast Stain No acid fast bacilli seen  [P]    Performed By: [facility] [place] [id number] Director: Mendez Mccarthy MD, PhDCLIA Number: [id number]   Acid Fast Stain No acid fast bacilli seen  [P]    Performed By: [facility] [place] [id number] Director: Mendez Mccarthy MD, PhDCLIA Number: [id number]  This is an appended report. These results have been appended to a previously preliminary verified report.                     RADIOLOGY:  Personally Reviewed.  No results found for this or any previous visit (from the past 24 hour(s)).    Active Problems:    Wound infection

## 2024-08-04 NOTE — PROGRESS NOTES
Orthopedic Surgery progress note:  Chuy is overall doing okay this morning.  He states his pain is well-controlled.  He just wants to discharge from the hospital and has questions about the plan.  Denies any new fevers, chills.        Vitals reviewed, stable  Focal examination of bilateral lower extremities demonstrates no obvious abnormality.  There are dressings in place that are clean, and dry with stable strikethrough when these are peeled back and the underlying surgical dressings are examined.  He reports numbness to the right dorsal aspect of his foot.  He is able to fire his gastrosoleus, and tibialis anterior.      48-year-old male status post right MTP fusion on 3/6/2024 that has been found to have a nonunion with revision fusion on 7/31/2024 and intraoperative cultures growing Staphylococcus Rate.  He is on IV vancomycin.  We are awaiting a plan once susceptibilities result to discuss appropriate antibiotic discharge plan.      Nonweightbearing right lower extremity   Maintain dressings  [facility] excellent cares of the medicine and ID team, will follow-up on susceptibilities  General care, discharge once antibiotic regimen finalized      Abdiel Jackson MD  [facility]

## 2024-08-05 LAB
BACTERIA TISS BX CULT: ABNORMAL
BACTERIA TISS BX CULT: ABNORMAL
BACTERIA TISS BX CULT: NO GROWTH
PATH REPORT.COMMENTS IMP SPEC: NORMAL
PATH REPORT.COMMENTS IMP SPEC: NORMAL
PATH REPORT.FINAL DX SPEC: NORMAL
PATH REPORT.GROSS SPEC: NORMAL
PATH REPORT.MICROSCOPIC SPEC OTHER STN: NORMAL
PATH REPORT.RELEVANT HX SPEC: NORMAL
PHOTO IMAGE: NORMAL

## 2024-08-05 PROCEDURE — 88311 DECALCIFY TISSUE: CPT | Mod: 26 | Performed by: PATHOLOGY

## 2024-08-05 PROCEDURE — 88305 TISSUE EXAM BY PATHOLOGIST: CPT | Mod: 26 | Performed by: PATHOLOGY

## 2024-08-07 LAB
BACTERIA BLD CULT: NO GROWTH
BACTERIA TISS BX CULT: NORMAL

## 2024-08-09 LAB — BACTERIA TISS BX CULT: NORMAL

## 2024-08-14 LAB
BACTERIA TISS BX CULT: ABNORMAL
BACTERIA TISS BX CULT: ABNORMAL
BACTERIA TISS BX CULT: NORMAL

## 2024-08-25 ENCOUNTER — HEALTH MAINTENANCE LETTER (OUTPATIENT)
Age: 49
End: 2024-08-25

## 2024-08-28 LAB
BACTERIA TISS BX CULT: NO GROWTH

## 2024-09-10 ENCOUNTER — OFFICE VISIT (OUTPATIENT)
Dept: INFECTIOUS DISEASES | Facility: CLINIC | Age: 49
End: 2024-09-10
Payer: COMMERCIAL

## 2024-09-10 VITALS
BODY MASS INDEX: 27.87 KG/M2 | SYSTOLIC BLOOD PRESSURE: 122 MMHG | TEMPERATURE: 98.2 F | WEIGHT: 199.8 LBS | OXYGEN SATURATION: 98 % | DIASTOLIC BLOOD PRESSURE: 85 MMHG | HEART RATE: 107 BPM

## 2024-09-10 DIAGNOSIS — T14.8XXA WOUND INFECTION: Primary | ICD-10-CM

## 2024-09-10 DIAGNOSIS — L08.9 WOUND INFECTION: Primary | ICD-10-CM

## 2024-09-10 PROCEDURE — 99213 OFFICE O/P EST LOW 20 MIN: CPT | Performed by: INTERNAL MEDICINE

## 2024-09-10 RX ORDER — TRIAMCINOLONE ACETONIDE 1 MG/ML
LOTION TOPICAL
COMMUNITY
Start: 2024-08-09

## 2024-09-10 RX ORDER — CLONAZEPAM 1 MG/1
1 TABLET ORAL AT BEDTIME
COMMUNITY

## 2024-09-10 RX ORDER — EPINEPHRINE 0.3 MG/.3ML
0.3 INJECTION SUBCUTANEOUS PRN
COMMUNITY
Start: 2023-10-03

## 2024-09-10 RX ORDER — DOXYCYCLINE 100 MG/1
100 CAPSULE ORAL 2 TIMES DAILY
Qty: 90 CAPSULE | Refills: 0 | Status: SHIPPED | OUTPATIENT
Start: 2024-09-10

## 2024-09-10 RX ORDER — TRIAMCINOLONE ACETONIDE 0.25 MG/ML
LOTION TOPICAL
COMMUNITY
Start: 2024-02-07

## 2024-09-10 RX ORDER — CARBIDOPA AND LEVODOPA 50; 200 MG/1; MG/1
1 TABLET, EXTENDED RELEASE ORAL AT BEDTIME
COMMUNITY

## 2024-09-10 NOTE — PROGRESS NOTES
INFECTIOUS DISEASE [facility] FOLLOW UP NOTE      Date: 09/10/2024   Patient Name: Isma Cook   YOB: 1975  MRN: [id number]      ASSESSMENT:  S butch hardware infection, right great toe: present in 4/2024 and 7/2024 cultures. Hardware in place-likely cause of nonhealing. No systemic signs infection. Now s/p replacement of hardware 7/31. Plan 3 months antibiotics.   Parkinsons    PLAN:  Improvement on 6 weeks doxycyline. Given hardware, nonhealing favor completing 3 months of antibiotics. If recurrent infection despite 3 months of antibiotics then likely needs hardware removal.  Discussed with patient and family and they are in agreement.  Follow up with ortho.     Valencia Haskins MD  [facility]   Clinic phone: [phone]   Clinic fax: [phone]    ______________________________________________________________________    SUBJECTIVE / INTERVAL HISTORY: Isma Cook returns for follow up of toe infection.    Doing well on antibiotics. Overall toe is healing well, best it's felt since the first surgery. Sees ortho, they also think healing well.      ROS: All other systems negative except as listed above.      Current Outpatient Medications:     acetaminophen (TYLENOL) 325 MG tablet, Take 2 tablets (650 mg) by mouth every 4 hours as needed for mild pain or other (and adjunct with moderate or severe pain or per patient request), Disp: , Rfl:     albuterol (PROAIR HFA/PROVENTIL HFA/VENTOLIN HFA) 108 (90 Base) MCG/ACT inhaler, Inhale 2 puffs into the lungs every 4 hours as needed, Disp: , Rfl:     carbidopa-levodopa (SINEMET CR)  MG CR tablet, Take 1 tablet by mouth at bedtime., Disp: , Rfl:     carbidopa-levodopa (SINEMET)  MG tablet, Take 1 tablet by mouth 3 times daily, Disp: , Rfl:     clonazePAM (KLONOPIN) 1 MG tablet, Take 1 mg by mouth at bedtime., Disp: , Rfl:     doxycycline monohydrate (MONODOX) 100 MG capsule, Take 1 capsule (100 mg) by  mouth 2 times daily., Disp: 90 capsule, Rfl: 0    fluticasone-vilanterol (BREO ELLIPTA) 200-25 MCG/ACT inhaler, Inhale 1 puff into the lungs daily, Disp: , Rfl:     ketoconazole (NIZORAL) 2 % external shampoo, Apply topically daily as needed, Disp: , Rfl:     LANsoprazole (PREVACID) 30 MG DR capsule, Take 30 mg by mouth every morning (before breakfast), Disp: , Rfl:     rosuvastatin (CRESTOR) 10 MG tablet, Take 10 mg by mouth daily, Disp: , Rfl:     sertraline (ZOLOFT) 100 MG tablet, Take 100 mg by mouth daily, Disp: , Rfl:     Suvorexant (BELSOMRA) 10 MG tablet, Take 10 mg by mouth at bedtime, Disp: , Rfl:     triamcinolone (KENALOG) 0.1 % external lotion, APPLY TOPICALLY TO THE SCALP TWICE DAILY AS NEEDED, Disp: , Rfl:     triamcinolone acetonide 0.025 % LOTN, APPLY TO THE AFFECTED AREA ON THE FACE TWICE DAILY AS NEEDED, Disp: , Rfl:     EPINEPHrine (ANY BX GENERIC EQUIV) 0.3 MG/0.3ML injection 2-pack, Inject 0.3 mg into the muscle as needed. (Patient not taking: Reported on 9/10/2024), Disp: , Rfl:     hydrOXYzine HCl (ATARAX) 25 MG tablet, Take 25 mg by mouth every 6 hours as needed for other (pain) (Patient not taking: Reported on 9/10/2024), Disp: , Rfl:     oxyCODONE (ROXICODONE) 5 MG tablet, Take 1 tablet (5 mg) by mouth every 4 hours as needed for severe pain or moderate pain (Patient not taking: Reported on 9/10/2024), Disp: 12 tablet, Rfl: 0    senna-docusate (SENOKOT-S/PERICOLACE) 8.6-50 MG tablet, Take 1 tablet by mouth 2 times daily as needed for constipation (Patient not taking: Reported on 9/10/2024), Disp: 20 tablet, Rfl: 0      OBJECTIVE:  /85 (BP Location: Right arm, Patient Position: Sitting, Cuff Size: Adult Regular)   Pulse 107   Temp 98.2  F (36.8  C) (Oral)   Wt 90.6 kg (199 lb 12.8 oz)   SpO2 98%   BMI 27.87 kg/m        GEN: No acute distress.    RESPIRATORY:  Normal breathing pattern.  SKIN/HAIR/NAILS:  No rashes  Toe healing incision, less swollen       Pertinent labs:    No  "results found for: \"CRP\"  Last Comprehensive Metabolic Panel:  Sodium   Date Value Ref Range Status   08/04/2024 139 135 - 145 mmol/L Final     Potassium   Date Value Ref Range Status   08/04/2024 4.0 3.4 - 5.3 mmol/L Final     Chloride   Date Value Ref Range Status   08/04/2024 104 98 - 107 mmol/L Final     Carbon Dioxide (CO2)   Date Value Ref Range Status   08/04/2024 28 22 - 29 mmol/L Final     Anion Gap   Date Value Ref Range Status   08/04/2024 7 7 - 15 mmol/L Final     Glucose   Date Value Ref Range Status   08/04/2024 98 70 - 99 mg/dL Final     Urea Nitrogen   Date Value Ref Range Status   08/04/2024 15.5 6.0 - 20.0 mg/dL Final     Creatinine   Date Value Ref Range Status   08/04/2024 0.99 0.67 - 1.17 mg/dL Final     GFR Estimate   Date Value Ref Range Status   08/04/2024 >90 >60 mL/min/1.73m2 Final     Comment:     eGFR calculated using 2021 CKD-EPI equation.     Calcium   Date Value Ref Range Status   08/04/2024 8.9 8.8 - 10.4 mg/dL Final     Comment:     Reference intervals for this test were updated on 7/16/2024 to reflect our healthy population more accurately. There may be differences in the flagging of prior results with similar values performed with this method. Those prior results can be interpreted in the context of the updated reference intervals.     CBC RESULTS:   Recent Labs   Lab Test 08/04/24  0612   WBC 6.0   RBC 4.41   HGB 12.6*   HCT 38.0*   MCV 86   MCH 28.6   MCHC 33.2   RDW 14.1          MICROBIOLOGY DATA:  S caprae   T Cell Subset:  WBC Count   Date Value Ref Range Status   08/04/2024 6.0 4.0 - 11.0 10e3/uL Final     % Lymphocytes   Date Value Ref Range Status   08/03/2024 39 % Final   RADIOLOGY:    No results found for this or any previous visit (from the past 744 hour(s)).     Total Time Spent 15 minutes including chart review, time with patient, orders, and documentation.    "

## 2024-09-26 LAB
ACID FAST STAIN (ARUP): NORMAL

## 2024-11-19 ENCOUNTER — OFFICE VISIT (OUTPATIENT)
Dept: INFECTIOUS DISEASES | Facility: CLINIC | Age: 49
End: 2024-11-19
Payer: COMMERCIAL

## 2024-11-19 VITALS
DIASTOLIC BLOOD PRESSURE: 82 MMHG | BODY MASS INDEX: 27.13 KG/M2 | WEIGHT: 194.5 LBS | HEART RATE: 89 BPM | TEMPERATURE: 97.9 F | SYSTOLIC BLOOD PRESSURE: 118 MMHG | OXYGEN SATURATION: 98 %

## 2024-11-19 DIAGNOSIS — L08.9 WOUND INFECTION: Primary | ICD-10-CM

## 2024-11-19 DIAGNOSIS — T14.8XXA WOUND INFECTION: Primary | ICD-10-CM

## 2024-11-19 PROCEDURE — 99213 OFFICE O/P EST LOW 20 MIN: CPT | Performed by: INTERNAL MEDICINE

## 2024-11-19 NOTE — PROGRESS NOTES
INFECTIOUS DISEASE [facility] FOLLOW UP NOTE      Date: 11/19/2024   Patient Name: Isma Cook   YOB: 1975  MRN: [id number]      ASSESSMENT:  S butch hardware infection, right great toe: present in 4/2024 and 7/2024 cultures. Hardware in place-likely cause of nonhealing. No systemic signs infection. Now s/p replacement of hardware 7/31. Completed 3 months antibiotics. Labs off abx normal. CT with possible nonhealing bone.   Parkinsons    PLAN:  Completed 3 months antibiotics  Clinically, foot healed, hiking mountains, recent labs (CRP, SED, WBC all normal)  If recurrent infection despite 3 months of antibiotics then likely needs hardware removal.  Discussed with patient and in agreement.  Follow up with ortho.     Valencia Haskins MD  [facility]   Clinic phone: [phone]   Clinic fax: [phone]    ______________________________________________________________________    SUBJECTIVE / INTERVAL HISTORY: Isma Cook returns for follow up of toe infection.    Completed antibiotics a few weeks ago. Hiked a mountain in [place]. Foot is doing ok. CT scan with ortho concerning for impaired bone healing labs from ortho with SED 2, CRP <1, WBC 7 (all normal). He is doing well. Ortho plans bone stimulator.     ROS: All other systems negative except as listed above.      Current Outpatient Medications:     acetaminophen (TYLENOL) 325 MG tablet, Take 2 tablets (650 mg) by mouth every 4 hours as needed for mild pain or other (and adjunct with moderate or severe pain or per patient request), Disp: , Rfl:     albuterol (PROAIR HFA/PROVENTIL HFA/VENTOLIN HFA) 108 (90 Base) MCG/ACT inhaler, Inhale 2 puffs into the lungs every 4 hours as needed, Disp: , Rfl:     carbidopa-levodopa (SINEMET CR)  MG CR tablet, Take 1 tablet by mouth at bedtime., Disp: , Rfl:     carbidopa-levodopa (SINEMET)  MG tablet, Take 1 tablet by mouth 3 times daily, Disp: , Rfl:      "clonazePAM (KLONOPIN) 1 MG tablet, Take 1 mg by mouth at bedtime., Disp: , Rfl:     EPINEPHrine (ANY BX GENERIC EQUIV) 0.3 MG/0.3ML injection 2-pack, Inject 0.3 mg into the muscle as needed., Disp: , Rfl:     fluticasone-vilanterol (BREO ELLIPTA) 200-25 MCG/ACT inhaler, Inhale 1 puff into the lungs daily, Disp: , Rfl:     ketoconazole (NIZORAL) 2 % external shampoo, Apply topically daily as needed, Disp: , Rfl:     LANsoprazole (PREVACID) 30 MG DR capsule, Take 30 mg by mouth every morning (before breakfast), Disp: , Rfl:     rosuvastatin (CRESTOR) 10 MG tablet, Take 10 mg by mouth daily, Disp: , Rfl:     sertraline (ZOLOFT) 100 MG tablet, Take 100 mg by mouth daily, Disp: , Rfl:     triamcinolone (KENALOG) 0.1 % external lotion, APPLY TOPICALLY TO THE SCALP TWICE DAILY AS NEEDED, Disp: , Rfl:     triamcinolone acetonide 0.025 % LOTN, APPLY TO THE AFFECTED AREA ON THE FACE TWICE DAILY AS NEEDED, Disp: , Rfl:       OBJECTIVE:  /82 (BP Location: Right arm)   Pulse 89   Temp 97.9  F (36.6  C) (Oral)   Wt 88.2 kg (194 lb 8 oz)   SpO2 98%   BMI 27.13 kg/m        GEN: No acute distress.    RESPIRATORY:  Normal breathing pattern.  SKIN/HAIR/NAILS:  No rashes  Toe incision healed well. No swelling, redness, TTP      Pertinent labs:    No results found for: \"CRP\"  Last Comprehensive Metabolic Panel:  Sodium   Date Value Ref Range Status   08/04/2024 139 135 - 145 mmol/L Final     Potassium   Date Value Ref Range Status   08/04/2024 4.0 3.4 - 5.3 mmol/L Final     Chloride   Date Value Ref Range Status   08/04/2024 104 98 - 107 mmol/L Final     Carbon Dioxide (CO2)   Date Value Ref Range Status   08/04/2024 28 22 - 29 mmol/L Final     Anion Gap   Date Value Ref Range Status   08/04/2024 7 7 - 15 mmol/L Final     Glucose   Date Value Ref Range Status   08/04/2024 98 70 - 99 mg/dL Final     Urea Nitrogen   Date Value Ref Range Status   08/04/2024 15.5 6.0 - 20.0 mg/dL Final     Creatinine   Date Value Ref Range Status "   08/04/2024 0.99 0.67 - 1.17 mg/dL Final     GFR Estimate   Date Value Ref Range Status   08/04/2024 >90 >60 mL/min/1.73m2 Final     Comment:     eGFR calculated using 2021 CKD-EPI equation.     Calcium   Date Value Ref Range Status   08/04/2024 8.9 8.8 - 10.4 mg/dL Final     Comment:     Reference intervals for this test were updated on 7/16/2024 to reflect our healthy population more accurately. There may be differences in the flagging of prior results with similar values performed with this method. Those prior results can be interpreted in the context of the updated reference intervals.     CBC RESULTS:   Recent Labs   Lab Test 08/04/24  0612   WBC 6.0   RBC 4.41   HGB 12.6*   HCT 38.0*   MCV 86   MCH 28.6   MCHC 33.2   RDW 14.1          MICROBIOLOGY DATA:  S caprae   T Cell Subset:  WBC Count   Date Value Ref Range Status   08/04/2024 6.0 4.0 - 11.0 10e3/uL Final     % Lymphocytes   Date Value Ref Range Status   08/03/2024 39 % Final   RADIOLOGY:    No results found for this or any previous visit (from the past 744 hours).     Total Time Spent 15 minutes including chart review, time with patient, orders, and documentation.

## 2024-11-19 NOTE — LETTER
2024    Isma Cook   1975        To Whom it May Concern;    Isma Cook had a coagulase negative staph infection which was treated. This infection is not considered contagious at any point in time and therefore he is not contagious.     Sincerely,        Valencia Haskins MD